# Patient Record
Sex: FEMALE | Race: BLACK OR AFRICAN AMERICAN | NOT HISPANIC OR LATINO | Employment: FULL TIME | URBAN - METROPOLITAN AREA
[De-identification: names, ages, dates, MRNs, and addresses within clinical notes are randomized per-mention and may not be internally consistent; named-entity substitution may affect disease eponyms.]

---
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
## 2020-06-01 LAB — POC BETA-HCG (QUAL): NEGATIVE

## 2020-08-03 LAB — POC BETA-HCG (QUAL): NEGATIVE

## 2020-08-07 ENCOUNTER — HISTORICAL (OUTPATIENT)
Dept: ADMINISTRATIVE | Facility: HOSPITAL | Age: 20
End: 2020-08-07

## 2020-08-07 LAB
HIV 1+2 AB+HIV1 P24 AG SERPL QL IA: NONREACTIVE
T PALLIDUM AB SER QL: NONREACTIVE

## 2020-11-17 ENCOUNTER — HISTORICAL (OUTPATIENT)
Dept: ADMINISTRATIVE | Facility: HOSPITAL | Age: 20
End: 2020-11-17

## 2020-11-17 LAB
HAV IGM SERPL QL IA: NONREACTIVE
HBV CORE IGM SERPL QL IA: NONREACTIVE
HBV SURFACE AG SERPL QL IA: NONREACTIVE
HCV AB SERPL QL IA: NONREACTIVE
HIV 1+2 AB+HIV1 P24 AG SERPL QL IA: NONREACTIVE
T PALLIDUM AB SER QL: NONREACTIVE

## 2020-12-01 ENCOUNTER — HISTORICAL (OUTPATIENT)
Dept: ADMINISTRATIVE | Facility: HOSPITAL | Age: 20
End: 2020-12-01

## 2020-12-01 LAB
FLUAV AG UPPER RESP QL IA.RAPID: NEGATIVE
FLUBV AG UPPER RESP QL IA.RAPID: NEGATIVE
SARS-COV-2 RNA RESP QL NAA+PROBE: NOT DETECTED

## 2022-04-09 ENCOUNTER — HISTORICAL (OUTPATIENT)
Dept: ADMINISTRATIVE | Facility: HOSPITAL | Age: 22
End: 2022-04-09
Payer: MEDICAID

## 2022-04-29 VITALS
OXYGEN SATURATION: 100 % | DIASTOLIC BLOOD PRESSURE: 64 MMHG | WEIGHT: 129.88 LBS | HEIGHT: 65 IN | BODY MASS INDEX: 21.64 KG/M2 | SYSTOLIC BLOOD PRESSURE: 117 MMHG

## 2022-04-30 NOTE — PROGRESS NOTES
Patient:   Shani Stephens             MRN: [id number]            FIN: [id number]               Age:   20 years     Sex:  Female     :  2000   Associated Diagnoses:   Routine screening for STI (sexually transmitted infection)   Author:   Nancy Perez MD      Chief Complaint   2020 8:54 CST      STI exposure      History of Present Illness   21 y/o female presents to walk-in clinic for STI testing     Hx of Gonorrhea/Chlamydia s/p treatment 2 months ago.  Currently asymptomatic, no known exposure in particular  Denies dysuria, hematuria, vaginal bleeding, vaginal discharge, vaginal lesions, fever, rash, abdominal pain, or CVA pain  Sexually active with one male partner, uses condoms consistently.    Also reports an episode of vaginal pruritus one week ago that resolved with Monistat  Currently on menstrual cycle and thus is declining pelvic exam today      Review of Systems   Constitutional:  No fever, No chills.    Respiratory:  No shortness of breath.    Cardiovascular:  No chest pain, No peripheral edema.    Gastrointestinal:  No nausea, No vomiting, No diarrhea, No constipation, No abdominal pain.    Genitourinary:  No dysuria, No hematuria.       Physical Examination   Vital Signs   2020 8:54 CST      Temperature Oral          36.5 DegC                             Temperature Oral (calculated)             97.70 DegF                             Peripheral Pulse Rate     77 bpm                             Respiratory Rate          18 br/min                             SpO2                      100 %                             Oxygen Therapy            Room air                             Systolic Blood Pressure   106 mmHg                             Diastolic Blood Pressure  71 mmHg                             Blood Pressure Location   Left arm                             Manual Cuff BP            Yes                             O2 SAT at rest            100 %     General:  No acute  distress, Patient seated on exam table upon my arrival.    Eye:  Extraocular movements are intact, Normal conjunctiva.    HENT:  Normocephalic.    Neck:  Supple, Non-tender, No lymphadenopathy, No thyromegaly.    Respiratory:  Lungs are clear to auscultation, Respirations are non-labored, Breath sounds are equal, Symmetrical chest wall expansion.    Cardiovascular:  Normal rate, Regular rhythm, No edema.    Gastrointestinal:  Soft, Non-tender, Non-distended, Normal bowel sounds.    Musculoskeletal:  No swelling, No deformity.    Integumentary:  Warm, Dry, Intact, No pallor, No rash.    Neurologic:  Alert, Oriented, No focal deficits.    Cognition and Speech:  Speech clear and coherent.    Psychiatric:  Cooperative, Appropriate mood & affect.       Impression and Plan   Diagnosis     Routine screening for STI (sexually transmitted infection) ([id number] Z11.3).       Urine sent for GC, chlamydia  Also testing for HIV, syphilis, hepatitis  Will call with resultsPH #[phone]    Nancy Perez MD -II      Family Medicine Resident      no

## 2022-05-02 NOTE — HISTORICAL [facility] CERNER
This is a historical note converted from Hector. Formatting and pictures may have been removed.  Please reference Hector for original formatting and attached multimedia. Chief Complaint  6 month f/u  History of Present Illness  19 yo AAF with no PMH reports to [facility] for annual well checkup.? No complaints. ?Patient is?followed by [facility] clinic?for?Depo-Provera injections every 3 months.? Last Depo-Provera?on June 27.? Sexually active with one male partner reports consistent condom use.? Has 1-year-old daughter.? Works at [employer].? 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 ?Immediate family members have no health problems. ?She has never been a smoker never used illicit drugs or tried alcohol  ?  Review of Systems  Constitutional: No fever. No chills. No weakness. No fatigue. No decreased activity.?  Eye: No recent visual problem. No blurry vision.?  Respiratory: No shortness of breath.?  Cardiovascular: No chest pain. No palpitations. No edema.?  Gastrointestinal: No n/v/d. No constipation. No abdominal pain.?  Musculoskeletal: No muscle pain. No joint pain.?  Integumentary: No rashes. No lesions.?  Neurologic: No numbness. No tingling, No headache.?  Psychiatric: No depression. No anxiety. No SI, HI or AVH.?  Physical Exam  Gen: NAD  HEENT: NCAT, PEERL, EOMI, MMM  Neck: supple, NT, no thyromegally  CV: RRR, no m/r/g, no edema  Resp: CTAB  Abd: soft, NT, ND, +BS  Ext: no swelling, no tenderness, no deformity  Neuro: A&Ox3, no focal deficits  Psych: cooperative, appropriate mood and affect?  Assessment/Plan  1.?Well adult health check  -Patient declines?routine STI testing  -Anticipatory guidance including safe sex, education,?parenting  -Pap Smear not indicated until 21 years old  -Up-to-date on immunizations  Return to clinic in 1 year?for well checkup  Ordered:  CBC w/ Auto Diff, Routine collect, 07/02/18 8:21:00 CDT, Blood, Order for future visit, Stop date 07/02/18 8:21:00  CDT, Lab Collect, [facility] adult health check, 07/02/18 8:21:00 CDT  Clinic Follow up, *Est. 06/02/19 3:00:00 CDT, Order for future visit, [facility] adult health check, [facility]  Comprehensive Metabolic Panel, Routine collect, 07/02/18 8:21:00 CDT, Blood, Order for future visit, Stop date 07/02/18 8:21:00 CDT, Lab Collect, [facility] adult health check, 07/02/18 8:21:00 CDT  ?   Problem List/Past Medical History  Ongoing  No qualifying data  Historical  Pregnant  Procedure/Surgical History  Extraction of Products of Conception, Vacuum, Via Natural or Artificial Opening (03/18/2017), Repair Perineum Muscle, Open Approach (03/18/2017), Repair Vulva, External Approach (03/18/2017), none.  Medications  No active medications  Allergies  No Known Medication Allergies  Social History  Alcohol  Never, 01/21/2017  Substance Abuse  Never, 03/18/2017  Tobacco  Never smoker, 05/13/2016  Health Maintenance  Health Maintenance  ???Pending?(in the next year)  ??? ??Due?  ??? ? ? ?Alcohol Misuse Screening due??07/02/18??and every 1??year(s)  ??? ? ? ?Tetanus Vaccine due??07/02/18??and every 10??year(s)  ??? ??Due In Future?  ??? ? ? ?Influenza Vaccine not due until??10/02/18??and every 1??year(s)  ??? ? ? ?Blood Pressure Screening not due until??01/03/19??and every 1??year(s)  ??? ? ? ?Body Mass Index Check not due until??01/03/19??and every 1??year(s)  ??? ? ? ?Depression Screening not due until??01/03/19??and every 1??year(s)  ??? ? ? ?Obesity Screening not due until??01/03/19??and every 1??year(s)  ??? ? ? ?Tobacco Use Screening not due until??01/03/19??and every 1??year(s)  ???Satisfied?(in the past 1 year)  ??? ??Satisfied?  ??? ? ? ?Blood Pressure Screening on??01/03/18.??Satisfied by Yvonne Waldrop LPN  ??? ? ? ?Body Mass Index Check on??01/03/18.??Satisfied by SYSTEM  ??? ? ? ?Depression Screening on??01/03/18.??Satisfied by Yvonne Waldrop LPN  ??? ? ? ?Obesity Screening on??01/03/18.??Satisfied by Yvonne Waldrop LPN  ??? ?  ? ?Tobacco Use Screening on??01/03/18.??Satisfied by Yvonne Waldrop LPN  ?  ?      Discussed with resident at the time of visit. History of Present Illness, Physical Exam, and Assessment and Plan reviewed. Care provided was reasonable and necessary. Jammie Frank   Recommend? GC/Chlamydia testing with PAP and offering of STD testing at next visit

## 2022-05-02 NOTE — HISTORICAL [facility] CERNER
This is a historical note converted from Hector. Formatting and pictures may have been removed.  Please reference Hector for original formatting and attached multimedia. Chief Complaint  Results and medication  History of Present Illness  21 yo F who recently underwent STD testing due to vaginal bleeding after coitus.? She is in clinic to receive Antibiotic treatment and lab testing for Syphilis and HIV  Review of Systems  Gen: denies fever, chills  Abd: denies n,v,d  Physical Exam  Vitals & Measurements  T:?36.9? ?C (Oral)? HR:?73(Peripheral)? RR:?18? BP:?106/65? SpO2:?100%?  HT:?165.00?cm? WT:?58.200?kg? BMI:?21.38? LMP:?08/04/2020 00:00 CDT?  Gen: alert and oriented  Resp: non labored respirations  Abd: nontender to palpation over abdomen or suprapubic area  Assessment/Plan  1.?Chlamydia?A74.9  ?-She is receiving 500mg of Ceftriaxone IM and 1gram of Azithromycin po  ?-She will also be tested for HIV and Syphillis  Ordered:  Clinic Follow up, *Est. 02/07/21 3:00:00 CST, Order for future visit, Gonorrhea  Chlamydia  Screening for STDs (sexually transmitted diseases), [facility]  ?  2.?Gonorrhea?A54.9  ?-As above  Ordered:  Clinic Follow up, *Est. 02/07/21 3:00:00 CST, Order for future visit, Gonorrhea  Chlamydia  Screening for STDs (sexually transmitted diseases), [facility]  ?   Medications  Depo-Provera 150 mg/mL intramuscular suspension, 150 mg= 1 mL, IM, Once  Allergies  No Known Medication Allergies      HPI / PE reviewed. Agree with assessment; Management and plan of care appropriate.  Partner evaluation / treatment recommended.

## 2022-09-01 ENCOUNTER — OFFICE VISIT (OUTPATIENT)
Dept: URGENT CARE | Facility: CLINIC | Age: 22
End: 2022-09-01
Payer: MEDICAID

## 2022-09-01 VITALS
SYSTOLIC BLOOD PRESSURE: 111 MMHG | HEART RATE: 73 BPM | HEIGHT: 65 IN | OXYGEN SATURATION: 100 % | BODY MASS INDEX: 23.36 KG/M2 | RESPIRATION RATE: 16 BRPM | WEIGHT: 140.19 LBS | DIASTOLIC BLOOD PRESSURE: 72 MMHG | TEMPERATURE: 99 F

## 2022-09-01 DIAGNOSIS — M79.18 MUSCULOSKELETAL PAIN: Primary | ICD-10-CM

## 2022-09-01 PROCEDURE — 99213 OFFICE O/P EST LOW 20 MIN: CPT | Mod: PBBFAC | Performed by: FAMILY MEDICINE

## 2022-09-01 PROCEDURE — 99213 PR OFFICE/OUTPT VISIT, EST, LEVL III, 20-29 MIN: ICD-10-PCS | Mod: S$PBB,,, | Performed by: FAMILY MEDICINE

## 2022-09-01 PROCEDURE — 99213 OFFICE O/P EST LOW 20 MIN: CPT | Mod: S$PBB,,, | Performed by: FAMILY MEDICINE

## 2022-09-01 RX ORDER — DICLOFENAC SODIUM 75 MG/1
75 TABLET, DELAYED RELEASE ORAL 2 TIMES DAILY
Qty: 30 TABLET | Refills: 1 | Status: SHIPPED | OUTPATIENT
Start: 2022-09-01 | End: 2023-01-09

## 2022-09-01 NOTE — PROGRESS NOTES
"Subjective:       Patient ID: Shani Stephens is a 22 y.o. female.    Vitals:  height is 5' 5.35" (1.66 m) and weight is 63.6 kg (140 lb 3.4 oz). Her temperature is 99.1 °F (37.3 °C). Her blood pressure is 111/72 and her pulse is 73. Her respiration is 16 and oxygen saturation is 100%.     Chief Complaint: Shoulder Pain (X 1wk  lt side radiating to rib area)    HPI  Several days of pain a in the left trapezius area, no precipitating event.  No neck pain, no actual shoulder or rib pain.  No chest discomfort.  No cough or shortness of breath.  Denies radicular symptoms.  Does lots of lifting at [employer]  ROS    Constitutional: negative except as stated in HPI  Eye: negative except as stated in HPI  ENT: negative except as stated in HPI  Respiratory: negative except as stated in HPI  Cardiovascular: negative except as stated in HPI  Gastrointestinal: negative except as stated in HPI  Genitourinary: negative except as stated in HPI  Objective:      Physical Exam    VITAL SIGNS:  Reviewed.      GENERAL:  In no apparent distress  HEAD:  No signs of head trauma    MUSCULOSKELETAL:  CERVICAL SPINE:   - FROM   - no bony tenderness or step offs   - spasm and tenderness of left trapezius, reproduces symptoms   - negative Spurling´s bilaterally  Left SHOULDER    - No erythema or edema   - ROM fair   - Nontender to palpation   - negative drop test, negative empty can, negative Speed's, negative infraspinatus test, negative subscapularis test, negative Hawkin´s,  negative Neer´s  negative crossover test,    - 5/5 strength biceps/triceps/   - Cap refill ?2 seconds   - Sensory intact to light touch distally  NEUROLOGIC EXAM:  Alert and oriented x 3.  No focal sensory or strength deficits.   Speech normal.  Follows commands      Assessment:       1. Musculoskeletal pain            Plan:         Musculoskeletal pain    Other orders  -     diclofenac (VOLTAREN) 75 MG EC tablet; Take 1 tablet (75 mg total) by mouth 2 (two) times " daily.  Dispense: 30 tablet; Refill: 1         Diclofenac with food, discussed potential side effects.  Use low heat, topical analgesics.  Please follow instructions on patient education material.  Return to urgent care in 2 to 3 days if symptoms are not improving, immediately if you develop any new or worsening symptoms.

## 2022-09-16 ENCOUNTER — HISTORICAL (OUTPATIENT)
Dept: ADMINISTRATIVE | Facility: HOSPITAL | Age: 22
End: 2022-09-16
Payer: MEDICAID

## 2022-11-25 PROCEDURE — 81025 URINE PREGNANCY TEST: CPT | Performed by: INTERNAL MEDICINE

## 2022-11-25 PROCEDURE — 81003 URINALYSIS AUTO W/O SCOPE: CPT | Performed by: INTERNAL MEDICINE

## 2022-11-25 PROCEDURE — 81001 URINALYSIS AUTO W/SCOPE: CPT | Performed by: INTERNAL MEDICINE

## 2022-11-25 PROCEDURE — 99284 EMERGENCY DEPT VISIT MOD MDM: CPT | Mod: 25

## 2022-11-25 PROCEDURE — 87088 URINE BACTERIA CULTURE: CPT | Performed by: INTERNAL MEDICINE

## 2022-11-26 ENCOUNTER — HOSPITAL ENCOUNTER (EMERGENCY)
Facility: HOSPITAL | Age: 22
Discharge: HOME | End: 2022-11-26
Attending: INTERNAL MEDICINE
Payer: MEDICAID

## 2022-11-26 VITALS
HEART RATE: 74 BPM | WEIGHT: 145 LBS | SYSTOLIC BLOOD PRESSURE: 129 MMHG | DIASTOLIC BLOOD PRESSURE: 77 MMHG | TEMPERATURE: 98 F | RESPIRATION RATE: 18 BRPM | HEIGHT: 64 IN | OXYGEN SATURATION: 100 % | BODY MASS INDEX: 24.75 KG/M2

## 2022-11-26 DIAGNOSIS — Z20.2 EXPOSURE TO SEXUALLY TRANSMITTED DISEASE (STD): Primary | ICD-10-CM

## 2022-11-26 DIAGNOSIS — N30.00 ACUTE CYSTITIS WITHOUT HEMATURIA: ICD-10-CM

## 2022-11-26 LAB
APPEARANCE UR: ABNORMAL
B-HCG SERPL QL: NEGATIVE
BACTERIA #/AREA URNS AUTO: ABNORMAL /HPF
BILIRUB UR QL STRIP.AUTO: NEGATIVE MG/DL
COLOR UR AUTO: YELLOW
GLUCOSE UR QL STRIP.AUTO: NEGATIVE MG/DL
KETONES UR QL STRIP.AUTO: NEGATIVE MG/DL
LEUKOCYTE ESTERASE UR QL STRIP.AUTO: ABNORMAL UNIT/L
MUCOUS THREADS URNS QL MICRO: ABNORMAL /LPF
NITRITE UR QL STRIP.AUTO: NEGATIVE
PH UR STRIP.AUTO: 7 [PH]
PROT UR QL STRIP.AUTO: NEGATIVE MG/DL
RBC #/AREA URNS AUTO: ABNORMAL /HPF
RBC UR QL AUTO: NEGATIVE UNIT/L
SP GR UR STRIP.AUTO: 1.02
SQUAMOUS #/AREA URNS AUTO: ABNORMAL /HPF
UROBILINOGEN UR STRIP-ACNC: 1 MG/DL
WBC #/AREA URNS AUTO: ABNORMAL /HPF

## 2022-11-26 PROCEDURE — 63700000 PHARM REV CODE 250 ALT 637 W/O HCPCS: Performed by: INTERNAL MEDICINE

## 2022-11-26 PROCEDURE — 63600175 PHARM REV CODE 636 W HCPCS: Performed by: INTERNAL MEDICINE

## 2022-11-26 PROCEDURE — 96372 THER/PROPH/DIAG INJ SC/IM: CPT | Performed by: INTERNAL MEDICINE

## 2022-11-26 PROCEDURE — 25000003 PHARM REV CODE 250: Performed by: INTERNAL MEDICINE

## 2022-11-26 RX ORDER — CEFTRIAXONE 1 G/1
500 INJECTION, POWDER, FOR SOLUTION INTRAMUSCULAR; INTRAVENOUS ONCE
Status: COMPLETED | OUTPATIENT
Start: 2022-11-26 | End: 2022-11-26

## 2022-11-26 RX ORDER — AZITHROMYCIN 250 MG/1
1000 TABLET, FILM COATED ORAL ONCE
Status: COMPLETED | OUTPATIENT
Start: 2022-11-26 | End: 2022-11-26

## 2022-11-26 RX ORDER — LIDOCAINE HYDROCHLORIDE 10 MG/ML
1 INJECTION INFILTRATION; PERINEURAL ONCE
Status: COMPLETED | OUTPATIENT
Start: 2022-11-26 | End: 2022-11-26

## 2022-11-26 RX ORDER — METRONIDAZOLE 500 MG/1
2 TABLET ORAL ONCE
Status: COMPLETED | OUTPATIENT
Start: 2022-11-26 | End: 2022-11-26

## 2022-11-26 RX ORDER — DOXYCYCLINE 100 MG/1
100 CAPSULE ORAL EVERY 12 HOURS
Qty: 14 CAPSULE | Refills: 0 | Status: SHIPPED | OUTPATIENT
Start: 2022-11-26 | End: 2022-12-03

## 2022-11-26 RX ADMIN — METRONIDAZOLE 2 G: 500 TABLET ORAL at 01:11

## 2022-11-26 RX ADMIN — CEFTRIAXONE SODIUM 500 MG: 1 INJECTION, POWDER, FOR SOLUTION INTRAMUSCULAR; INTRAVENOUS at 01:11

## 2022-11-26 RX ADMIN — LIDOCAINE HYDROCHLORIDE 1 ML: 10 INJECTION, SOLUTION INFILTRATION; PERINEURAL at 01:11

## 2022-11-26 RX ADMIN — AZITHROMYCIN MONOHYDRATE 1000 MG: 250 TABLET ORAL at 01:11

## 2022-11-26 NOTE — ED PROVIDER NOTES
"     Source of History:  Patient, no limitations    Chief complaint:  Exposure to STD ("My friend told me to come get tested fr STD.")      HPI:  Shani Stephens is a 22 y.o. female presenting with Exposure to STD ("My friend told me to come get tested fr STD.")         Sexually Transmitted Disease Check: Patient presents for sexually transmitted disease check. Sexual history reviewed with the patient. STD exposure: partner notified her of STD.  Previous history of STD:  none. Current symptoms include none.  Contraception: none.        Review of Systems   Constitutional symptoms:  Negative except as documented in HPI.   Skin symptoms:  Negative except as documented in HPI.   HEENT symptoms:  Negative except as documented in HPI.   Respiratory symptoms:  Negative except as documented in HPI.   Cardiovascular symptoms:  Negative except as documented in HPI.   Gastrointestinal symptoms:  Negative except as documented in HPI.    Genitourinary symptoms:  Negative except as documented in HPI.   Musculoskeletal symptoms:  Negative except as documented in HPI.   Neurologic symptoms:  Negative except as documented in HPI.   Psychiatric symptoms:  Negative except as documented in HPI.   Allergy/immunologic symptoms:  Negative except as documented in HPI.             Additional review of systems information: All other systems reviewed and otherwise negative.      Review of patient's allergies indicates:  No Known Allergies    PMH:  As per HPI and below:    History reviewed. No pertinent past medical history.     Family History   Problem Relation Age of Onset    No Known Problems Mother     No Known Problems Father        History reviewed. No pertinent surgical history.    Social History     Tobacco Use    Smoking status: Never    Smokeless tobacco: Never   Substance Use Topics    Alcohol use: Never    Drug use: Never       There is no problem list on file for this patient.       Physical Exam:    /66 (BP Location: Left " "arm, Patient Position: Sitting)   Pulse 63   Temp 98.2 °F (36.8 °C) (Oral)   Resp 18   Ht 5' 4" (1.626 m)   Wt 65.8 kg (145 lb)   LMP 11/07/2022   SpO2 100%   BMI 24.89 kg/m²     Nursing note and vital signs reviewed.    General:  Alert, no acute distress.   Skin: Normal for Ethnic Origin, No cyanosis  HEENT: Normocephalic and atraumatic, Vision unchanged, Pupils symmetric, No icterus , Nasal mucosa is pink and moist  Cardiovascular:  Regular rate and rhythm, No edema  Chest Wall: No deformity, equal chest rise  Respiratory:  Lungs are clear to auscultation, respirations are non-labored.    Musculoskeletal:  No deformity, Normal perfusion to all extremities  Back: No bony tenderness  : No suprapubic pain, No CVA tenderness  Gastrointestinal:  Soft, Nontender, Non distended, Normal bowel sounds.    Neurological:  Alert and oriented to person, place, time, and situation, normal motor observed, normal speech observed.    Psychiatric:  Cooperative, appropriate mood & affect.        Labs that have been ordered have been independently reviewed and interpreted by myself.     Old Chart Reviewed.      Initial Impression/ Differential Dx:  Vaginal infection such as yeast infection, vaginitis, topical irritant, bacterial vaginosis, STD such as gonorrhea, chlamydia, UTI, discomfort of pregnancy, ectopic pregnancy, ovarian cysts, ovarian torsion, malignancy, constipation, colitis, diverticulitis      MDM:      Reviewed Nurses Note.    Reviewed Pertinent old records.    Orders Placed This Encounter    Urine culture    Urinalysis    Pregnancy, urine rapid    Urinalysis, Microscopic    cefTRIAXone injection 500 mg    LIDOcaine HCL 10 mg/ml (1%) injection 1 mL    azithromycin tablet 1,000 mg    metroNIDAZOLE tablet 2 g    doxycycline (MONODOX) 100 MG capsule                    Labs Reviewed   URINALYSIS - Abnormal; Notable for the following components:       Result Value    Appearance, UA Hazy (*)     Leukocyte Esterase, " UA Trace (*)     All other components within normal limits   URINALYSIS, MICROSCOPIC - Abnormal; Notable for the following components:    Mucous, UA Small (*)     WBC, UA 21-50 (*)     Squamous Epithelial Cells, UA Few (*)     All other components within normal limits   PREGNANCY TEST, URINE RAPID - Normal   CULTURE, URINE          No orders to display        Admission on 11/26/2022   Component Date Value Ref Range Status    Color, UA 11/25/2022 Yellow  Yellow, Light-Yellow, Dark Yellow, Estefany, Straw Final    Appearance, UA 11/25/2022 Hazy (A)  Clear Final    Specific Gravity, UA 11/25/2022 1.020   Final    pH, UA 11/25/2022 7.0  5.0 - 8.5 Final    Protein, UA 11/25/2022 Negative  Negative mg/dL Final    Glucose, UA 11/25/2022 Negative  Negative, Normal mg/dL Final    Ketones, UA 11/25/2022 Negative  Negative mg/dL Final    Blood, UA 11/25/2022 Negative  Negative unit/L Final    Bilirubin, UA 11/25/2022 Negative  Negative mg/dL Final    Urobilinogen, UA 11/25/2022 1.0  0.2, 1.0, Normal mg/dL Final    Nitrites, UA 11/25/2022 Negative  Negative Final    Leukocyte Esterase, UA 11/25/2022 Trace (A)  Negative unit/L Final    Beta hCG Qualitative, Urine 11/25/2022 Negative  Negative Final    Bacteria, UA 11/25/2022 Rare  None Seen, Rare, Occasional /HPF Final    Mucous, UA 11/25/2022 Small (A)  None Seen /LPF Final    RBC, UA 11/25/2022 3-5  None Seen, 0-2, 3-5, 0-5 /HPF Final    WBC, UA 11/25/2022 21-50 (A)  None Seen, 0-2, 3-5, 0-5 /HPF Final    Squamous Epithelial Cells, UA 11/25/2022 Few (A)  None Seen, Rare, Occasional, Occ /HPF Final       Imaging Results    None                                              Diagnostic Impression:    1. Exposure to sexually transmitted disease (STD)    2. Acute cystitis without hematuria         ED Disposition Condition    Discharge Stable             Follow-up Information       [facility] - Emergency Dept.    Specialty: Emergency Medicine  Why: If symptoms  worsen  Contact information:  [address]  [facility] [address]  [phone]                            ED Prescriptions       Medication Sig Dispense Start Date End Date Auth. Provider    doxycycline (MONODOX) 100 MG capsule Take 1 capsule (100 mg total) by mouth every 12 (twelve) hours. for 7 days 14 capsule 11/26/2022 12/3/2022 Parish Ravi,           Follow-up Information       Follow up With Specialties Details Why Contact [facility] - Emergency Dept Emergency Medicine  If symptoms worsen [address]  [facility] [address]  [phone]             Parish Ravi,   11/26/22 0116

## 2022-11-28 LAB — BACTERIA UR CULT: ABNORMAL

## 2023-01-09 ENCOUNTER — OFFICE VISIT (OUTPATIENT)
Dept: FAMILY MEDICINE | Facility: CLINIC | Age: 23
End: 2023-01-09
Payer: MEDICAID

## 2023-01-09 VITALS
SYSTOLIC BLOOD PRESSURE: 122 MMHG | BODY MASS INDEX: 24.41 KG/M2 | HEART RATE: 80 BPM | HEIGHT: 64 IN | DIASTOLIC BLOOD PRESSURE: 74 MMHG | OXYGEN SATURATION: 100 % | RESPIRATION RATE: 18 BRPM | WEIGHT: 143 LBS | TEMPERATURE: 98 F

## 2023-01-09 DIAGNOSIS — Z12.4 SCREENING FOR CERVICAL CANCER: ICD-10-CM

## 2023-01-09 DIAGNOSIS — N92.6 MENSTRUAL PERIOD LATE: Primary | ICD-10-CM

## 2023-01-09 DIAGNOSIS — Z23 IMMUNIZATION DUE: ICD-10-CM

## 2023-01-09 LAB
B-HCG UR QL: NEGATIVE
CTP QC/QA: YES

## 2023-01-09 PROCEDURE — 87624 HPV HI-RISK TYP POOLED RSLT: CPT

## 2023-01-09 PROCEDURE — 90651 9VHPV VACCINE 2/3 DOSE IM: CPT | Mod: PBBFAC

## 2023-01-09 PROCEDURE — 81025 URINE PREGNANCY TEST: CPT | Mod: PBBFAC

## 2023-01-09 PROCEDURE — 90471 IMMUNIZATION ADMIN: CPT | Mod: PBBFAC

## 2023-01-09 PROCEDURE — 99213 OFFICE O/P EST LOW 20 MIN: CPT | Mod: PBBFAC

## 2023-01-09 RX ADMIN — HUMAN PAPILLOMAVIRUS 9-VALENT VACCINE, RECOMBINANT 0.5 ML: 30; 40; 60; 40; 20; 20; 20; 20; 20 INJECTION, SUSPENSION INTRAMUSCULAR at 01:01

## 2023-01-09 NOTE — PROGRESS NOTES
"  [facility] Note    Subjective:     Patient ID: Shani Stephens is a 22 y.o. female    Chief Complaint:   Chief Complaint   Patient presents with    Routine Pap smear       HPI  23 yo F presents for routine visit    Acute Concerns:  Patient requesting pap smear today. Denies any vaginal discharge or pruritus, declines STD testing today.    Has been actively trying to get pregnant and wanted to make sure everything was "ok with her". Currently has a 4yo daughter and reports no difficulty getting pregnant with her. LMP 12/05/2023, patient reports menstrual period is usually regular.     No other complaint or concerns today. No chronic conditions.     Healthcare Maintenance:  Cervical Ca Scrn: Last pap smear 08/2020; NIL  Immunizations: Due for HPV, Influenza, and Tetanus vaccine    Review of Systems  As per HPI    Objective:     Vitals:    01/09/23 1307   BP: 122/74   Pulse: 80   Resp: 18   Temp: 98.4 °F (36.9 °C)       Physical Exam    General: Well developed, no acute distress  HEENT: oral mucosa moist, no pharyngeal erythema. EOMI  CV: Regular rate rhythm, no murmurs, no edema, 2+ peripheral pulses  Resp: Non-labored breathing, symmetrical chest expansion bilaterally  Abd: soft, non-distended, non-tender to palpation, +BS, no organomegaly  : - External genitalia: No lesions, no erythema  - Urethral meatus: No abnormalities noted  - Bladder: No suprapubic tenderness  - Vaginal/pelvic support: No lesions, pink, moist vaginal mucosa without lesions. No blood, no discharge.  - Cervix: present with no gross mass, bleeding with cytobroom manipulation  - Uterus: mobile at pelvic brim  - Adnexa/parametria: No fullness or masses  - Anus/perineum: Intact without lesions or hemorrhoids      Assessment:     Problem List Items Addressed This Visit    None  Visit Diagnoses       Screening for cervical cancer    -  Primary    Relevant Orders    Liquid-Based Pap Smear, Screening Screening    Menstrual period " late        Relevant Orders    POCT Urine Pregnancy            Plan:       Menstrual Period Late  - Pregnancy Test in office  - Advised patient the best time to conceive is 12-17 days from day 1 of menstrual period, advised to keep ovulation calendar    Screening For Cervical Cancer  - Pap smear today    Healthcare Maintenance  - Patient declined Influenza vaccine  - Received Dose 1 of HPV Vaccine    RTC in 4 months for routine visit    Emely Gunter MD  [employer] [facility]  PGY-3

## 2023-01-20 LAB
HPV16+18+H RISK 12 DNA CVX-IMP: NEGATIVE
INSULIN SERPL-ACNC: ABNORMAL U[IU]/ML
LAB AP BETHESDA CATEGORY: ABNORMAL
LAB AP CLINICAL FINDINGS: ABNORMAL
LAB AP CONTRACEPTIVES: ABNORMAL
LAB AP GYN MOLECULAR TESTING: ABNORMAL
LAB AP LMP DATE: ABNORMAL
LAB AP OCHS PAP SPECIMEN ADEQUACY: ABNORMAL
LAB AP OHS PAP INTERPRETATION: ABNORMAL
LAB AP PAP DISCLAIMER COMMENTS: ABNORMAL
LAB AP PAP ESTROGEN REPLACEMENT THERAPY: ABNORMAL
LAB AP PAP PMP: ABNORMAL
LAB AP PAP PREVIOUS BX: ABNORMAL
LAB AP PAP PRIOR TREATMENT: ABNORMAL

## 2023-03-07 ENCOUNTER — OFFICE VISIT (OUTPATIENT)
Dept: URGENT CARE | Facility: CLINIC | Age: 23
End: 2023-03-07
Payer: MEDICAID

## 2023-03-07 VITALS
HEART RATE: 80 BPM | HEIGHT: 63 IN | SYSTOLIC BLOOD PRESSURE: 112 MMHG | BODY MASS INDEX: 25.34 KG/M2 | WEIGHT: 143 LBS | OXYGEN SATURATION: 100 % | RESPIRATION RATE: 16 BRPM | TEMPERATURE: 98 F | DIASTOLIC BLOOD PRESSURE: 66 MMHG

## 2023-03-07 DIAGNOSIS — R10.30 LOWER ABDOMINAL PAIN: Primary | ICD-10-CM

## 2023-03-07 LAB
APPEARANCE UR: ABNORMAL
B-HCG FREE SERPL-ACNC: 98.63 MIU/ML
B-HCG UR QL: NEGATIVE
BACTERIA #/AREA URNS AUTO: ABNORMAL /HPF
BILIRUB UR QL STRIP.AUTO: NEGATIVE MG/DL
BILIRUB UR QL STRIP: NEGATIVE
C TRACH DNA SPEC QL NAA+PROBE: NOT DETECTED
COLOR UR AUTO: ABNORMAL
CTP QC/QA: YES
GLUCOSE UR QL STRIP.AUTO: NORMAL MG/DL
GLUCOSE UR QL STRIP: NEGATIVE
HYALINE CASTS #/AREA URNS LPF: ABNORMAL /LPF
KETONES UR QL STRIP.AUTO: NEGATIVE MG/DL
KETONES UR QL STRIP: NEGATIVE
LEUKOCYTE ESTERASE UR QL STRIP.AUTO: 75 UNIT/L
LEUKOCYTE ESTERASE UR QL STRIP: POSITIVE
MUCOUS THREADS URNS QL MICRO: ABNORMAL /LPF
N GONORRHOEA DNA SPEC QL NAA+PROBE: NOT DETECTED
NITRITE UR QL STRIP.AUTO: NEGATIVE
PH UR STRIP.AUTO: 7 [PH]
PH, POC UA: 7
POC BLOOD, URINE: NEGATIVE
POC NITRATES, URINE: NEGATIVE
PROT UR QL STRIP.AUTO: ABNORMAL MG/DL
PROT UR QL STRIP: POSITIVE
RBC #/AREA URNS AUTO: ABNORMAL /HPF
RBC UR QL AUTO: NEGATIVE UNIT/L
SP GR UR STRIP.AUTO: 1.03
SP GR UR STRIP: 1.02 (ref 1–1.03)
SQUAMOUS #/AREA URNS LPF: ABNORMAL /HPF
UROBILINOGEN UR STRIP-ACNC: 0.2 (ref 0.1–1.1)
UROBILINOGEN UR STRIP-ACNC: NORMAL MG/DL
WBC #/AREA URNS AUTO: ABNORMAL /HPF

## 2023-03-07 PROCEDURE — 99214 OFFICE O/P EST MOD 30 MIN: CPT | Mod: S$PBB,,, | Performed by: FAMILY MEDICINE

## 2023-03-07 PROCEDURE — 99213 OFFICE O/P EST LOW 20 MIN: CPT | Mod: PBBFAC | Performed by: FAMILY MEDICINE

## 2023-03-07 PROCEDURE — 81001 URINALYSIS AUTO W/SCOPE: CPT | Performed by: FAMILY MEDICINE

## 2023-03-07 PROCEDURE — 99214 PR OFFICE/OUTPT VISIT, EST, LEVL IV, 30-39 MIN: ICD-10-PCS | Mod: S$PBB,,, | Performed by: FAMILY MEDICINE

## 2023-03-07 PROCEDURE — 81003 URINALYSIS AUTO W/O SCOPE: CPT | Mod: PBBFAC | Performed by: FAMILY MEDICINE

## 2023-03-07 PROCEDURE — 84702 CHORIONIC GONADOTROPIN TEST: CPT | Performed by: FAMILY MEDICINE

## 2023-03-07 PROCEDURE — 81025 URINE PREGNANCY TEST: CPT | Mod: PBBFAC | Performed by: FAMILY MEDICINE

## 2023-03-07 PROCEDURE — 87591 N.GONORRHOEAE DNA AMP PROB: CPT | Performed by: FAMILY MEDICINE

## 2023-03-07 PROCEDURE — 36416 COLLJ CAPILLARY BLOOD SPEC: CPT | Performed by: FAMILY MEDICINE

## 2023-03-08 ENCOUNTER — PATIENT MESSAGE (OUTPATIENT)
Dept: FAMILY MEDICINE | Facility: CLINIC | Age: 23
End: 2023-03-08
Payer: MEDICAID

## 2023-03-08 ENCOUNTER — TELEPHONE (OUTPATIENT)
Dept: URGENT CARE | Facility: CLINIC | Age: 23
End: 2023-03-08
Payer: MEDICAID

## 2023-03-08 NOTE — TELEPHONE ENCOUNTER
----- Message from Burton Borden MD sent at 3/8/2023  9:12 AM CST -----  Please notify patient that her serum pregnancy test is positive.  She will need to begin prenatal vitamins.  We need to know if she requires a referral to OB or will she set this up on her own? If she has any abdominal pain or vaginal bleeding, she needs to go to the emergency room.  Thanks

## 2023-03-08 NOTE — PROGRESS NOTES
"Subjective:       Patient ID: Shani Stephens is a 22 y.o. female.    Chief Complaint: Nausea, Abdominal Pain (Lower abdominal pain), and Flank Pain (L side)      HPI  21 yo female with nausea, suprapubic pain, and left flank pain for about a week.  Took 2 home pregnancy tests over the last few days that were positive.  Pregnancy test in clinic today is negative.  LMP approximately 1 month ago.   Review of Systems   Gastrointestinal:         As above   Genitourinary:         As above       Objective:       Vital Signs  Temp: 98 °F (36.7 °C)  Temp Source: Oral  Pulse: 80  Resp: 16  SpO2: 100 %  BP: 112/66  Height and Weight  Height: 5' 3.39" (161 cm)  Weight: 64.9 kg (143 lb)  BSA (Calculated - sq m): 1.7 sq meters  BMI (Calculated): 25  Weight in (lb) to have BMI = 25: 142.6]  Physical Exam  Constitutional:       Appearance: Normal appearance.   HENT:      Head: Normocephalic and atraumatic.   Eyes:      Extraocular Movements: Extraocular movements intact.      Conjunctiva/sclera: Conjunctivae normal.   Cardiovascular:      Rate and Rhythm: Normal rate and regular rhythm.      Heart sounds: Normal heart sounds.   Pulmonary:      Breath sounds: Normal breath sounds.   Abdominal:      General: Abdomen is flat. Bowel sounds are normal.      Palpations: Abdomen is soft.      Comments: Mild suprapubic TTP   Skin:     General: Skin is warm and dry.   Neurological:      General: No focal deficit present.      Mental Status: She is alert.   Psychiatric:         Mood and Affect: Mood and affect normal.         Speech: Speech normal.         Behavior: Behavior normal. Behavior is cooperative.         Thought Content: Thought content does not include homicidal or suicidal ideation.       Assessment:       Problem List Items Addressed This Visit    None  Visit Diagnoses       Lower abdominal pain    -  Primary    Relevant Orders    POCT Urinalysis, Dipstick, Automated, W/O Scope (Completed)    POCT urine pregnancy (Completed) "    HCG, Quantitative    Chlamydia/GC, PCR    Urinalysis, Reflex to Urine Culture              Plan:   Labs pending  ER precautions  FU with PCP

## 2023-03-26 ENCOUNTER — HOSPITAL ENCOUNTER (EMERGENCY)
Facility: HOSPITAL | Age: 23
Discharge: HOME | End: 2023-03-26
Attending: FAMILY MEDICINE
Payer: MEDICAID

## 2023-03-26 VITALS
BODY MASS INDEX: 24.35 KG/M2 | HEIGHT: 64 IN | HEART RATE: 77 BPM | DIASTOLIC BLOOD PRESSURE: 66 MMHG | WEIGHT: 142.63 LBS | TEMPERATURE: 99 F | SYSTOLIC BLOOD PRESSURE: 120 MMHG | RESPIRATION RATE: 16 BRPM | OXYGEN SATURATION: 100 %

## 2023-03-26 DIAGNOSIS — O21.9 NAUSEA AND VOMITING DURING PREGNANCY PRIOR TO 22 WEEKS GESTATION: Primary | ICD-10-CM

## 2023-03-26 LAB
ALBUMIN SERPL-MCNC: 4 G/DL (ref 3.5–5)
ALBUMIN/GLOB SERPL: 1.1 RATIO (ref 1.1–2)
ALP SERPL-CCNC: 44 UNIT/L (ref 40–150)
ALT SERPL-CCNC: 12 UNIT/L (ref 0–55)
APPEARANCE UR: ABNORMAL
AST SERPL-CCNC: 15 UNIT/L (ref 5–34)
B-HCG FREE SERPL-ACNC: ABNORMAL MIU/ML
B-HCG UR QL: POSITIVE
BACTERIA #/AREA URNS AUTO: ABNORMAL /HPF
BASOPHILS # BLD AUTO: 0.02 X10(3)/MCL (ref 0–0.2)
BASOPHILS NFR BLD AUTO: 0.3 %
BILIRUB UR QL STRIP.AUTO: NEGATIVE MG/DL
BILIRUBIN DIRECT+TOT PNL SERPL-MCNC: 0.9 MG/DL
BUN SERPL-MCNC: 11.5 MG/DL (ref 7–18.7)
CALCIUM SERPL-MCNC: 9.8 MG/DL (ref 8.4–10.2)
CHLORIDE SERPL-SCNC: 105 MMOL/L (ref 98–107)
CO2 SERPL-SCNC: 22 MMOL/L (ref 22–29)
COLOR UR AUTO: ABNORMAL
CREAT SERPL-MCNC: 0.73 MG/DL (ref 0.55–1.02)
CTP QC/QA: YES
EOSINOPHIL # BLD AUTO: 0.05 X10(3)/MCL (ref 0–0.9)
EOSINOPHIL NFR BLD AUTO: 0.6 %
ERYTHROCYTE [DISTWIDTH] IN BLOOD BY AUTOMATED COUNT: 12.1 % (ref 11.5–17)
GFR SERPLBLD CREATININE-BSD FMLA CKD-EPI: >60 MLS/MIN/1.73/M2
GLOBULIN SER-MCNC: 3.7 GM/DL (ref 2.4–3.5)
GLUCOSE SERPL-MCNC: 85 MG/DL (ref 74–100)
GLUCOSE UR QL STRIP.AUTO: NORMAL MG/DL
GROUP & RH: NORMAL
HCT VFR BLD AUTO: 38.8 % (ref 37–47)
HGB BLD-MCNC: 12.6 G/DL (ref 12–16)
HYALINE CASTS #/AREA URNS LPF: ABNORMAL /LPF
IMM GRANULOCYTES # BLD AUTO: 0.02 X10(3)/MCL (ref 0–0.04)
IMM GRANULOCYTES NFR BLD AUTO: 0.3 %
KETONES UR QL STRIP.AUTO: NEGATIVE MG/DL
LEUKOCYTE ESTERASE UR QL STRIP.AUTO: 250 UNIT/L
LYMPHOCYTES # BLD AUTO: 1.51 X10(3)/MCL (ref 0.6–4.6)
LYMPHOCYTES NFR BLD AUTO: 19.1 %
MCH RBC QN AUTO: 25.9 PG (ref 27–31)
MCHC RBC AUTO-ENTMCNC: 32.5 G/DL (ref 33–36)
MCV RBC AUTO: 79.8 FL (ref 80–94)
MONOCYTES # BLD AUTO: 0.58 X10(3)/MCL (ref 0.1–1.3)
MONOCYTES NFR BLD AUTO: 7.3 %
MUCOUS THREADS URNS QL MICRO: ABNORMAL /LPF
NEUTROPHILS # BLD AUTO: 5.72 X10(3)/MCL (ref 2.1–9.2)
NEUTROPHILS NFR BLD AUTO: 72.4 %
NITRITE UR QL STRIP.AUTO: NEGATIVE
NRBC BLD AUTO-RTO: 0 %
PH UR STRIP.AUTO: 6.5 [PH]
PLATELET # BLD AUTO: 188 X10(3)/MCL (ref 130–400)
PMV BLD AUTO: 10.3 FL (ref 7.4–10.4)
POTASSIUM SERPL-SCNC: 3.9 MMOL/L (ref 3.5–5.1)
PROT SERPL-MCNC: 7.7 GM/DL (ref 6.4–8.3)
PROT UR QL STRIP.AUTO: NEGATIVE MG/DL
RBC # BLD AUTO: 4.86 X10(6)/MCL (ref 4.2–5.4)
RBC #/AREA URNS AUTO: ABNORMAL /HPF
RBC UR QL AUTO: NEGATIVE UNIT/L
SODIUM SERPL-SCNC: 136 MMOL/L (ref 136–145)
SP GR UR STRIP.AUTO: 1.02
SQUAMOUS #/AREA URNS LPF: ABNORMAL /HPF
UROBILINOGEN UR STRIP-ACNC: NORMAL MG/DL
WBC # SPEC AUTO: 7.9 X10(3)/MCL (ref 4.5–11.5)
WBC #/AREA URNS AUTO: ABNORMAL /HPF

## 2023-03-26 PROCEDURE — 80053 COMPREHEN METABOLIC PANEL: CPT | Performed by: FAMILY MEDICINE

## 2023-03-26 PROCEDURE — 86900 BLOOD TYPING SEROLOGIC ABO: CPT | Performed by: FAMILY MEDICINE

## 2023-03-26 PROCEDURE — 99283 EMERGENCY DEPT VISIT LOW MDM: CPT

## 2023-03-26 PROCEDURE — 85025 COMPLETE CBC W/AUTO DIFF WBC: CPT | Performed by: FAMILY MEDICINE

## 2023-03-26 PROCEDURE — 81025 URINE PREGNANCY TEST: CPT | Performed by: FAMILY MEDICINE

## 2023-03-26 PROCEDURE — 81001 URINALYSIS AUTO W/SCOPE: CPT | Performed by: FAMILY MEDICINE

## 2023-03-26 PROCEDURE — 25000003 PHARM REV CODE 250: Performed by: FAMILY MEDICINE

## 2023-03-26 PROCEDURE — 84702 CHORIONIC GONADOTROPIN TEST: CPT | Performed by: FAMILY MEDICINE

## 2023-03-26 RX ORDER — DOXYLAMINE SUCCINATE AND PYRIDOXINE HYDROCHLORIDE, DELAYED RELEASE TABLETS 10 MG/10 MG 10; 10 MG/1; MG/1
2 TABLET, DELAYED RELEASE ORAL NIGHTLY PRN
Qty: 30 TABLET | Refills: 0 | OUTPATIENT
Start: 2023-03-26 | End: 2023-06-12

## 2023-03-26 RX ORDER — METOCLOPRAMIDE HYDROCHLORIDE 5 MG/ML
10 INJECTION INTRAMUSCULAR; INTRAVENOUS
Status: DISCONTINUED | OUTPATIENT
Start: 2023-03-26 | End: 2023-03-26

## 2023-03-26 RX ORDER — METOCLOPRAMIDE 10 MG/1
10 TABLET ORAL
Status: COMPLETED | OUTPATIENT
Start: 2023-03-26 | End: 2023-03-26

## 2023-03-26 RX ADMIN — METOCLOPRAMIDE 10 MG: 10 TABLET ORAL at 11:03

## 2023-03-27 NOTE — ED PROVIDER NOTES
Encounter Date: 3/26/2023       History     Chief Complaint   Patient presents with    Abdominal Pain     Found out pregnant 3 weeks ago, also dizzy and weakness.      Patient is a 22-year-old female presents emergency room complaints of lower abdominal pain, nausea, vomiting, diarrhea.  Patient reports symptoms of diarrhea began today.  Denies vaginal bleeding.  Reports recently found out that she was pregnant, last menstrual cycle was 02/10/2023 (6w2d).  Patient has a .  Patient is currently on prenatal vitamins, has an OB appointment scheduled for 2023.  Patient reports since being pregnant, she has had nausea with feelings of lightheadedness and weakness.    The history is provided by the patient.   Review of patient's allergies indicates:  No Known Allergies  History reviewed. No pertinent past medical history.  History reviewed. No pertinent surgical history.  Family History   Problem Relation Age of Onset    No Known Problems Mother     No Known Problems Father      Social History     Tobacco Use    Smoking status: Never    Smokeless tobacco: Never   Substance Use Topics    Alcohol use: Never    Drug use: Never     Review of Systems   Constitutional:  Negative for chills, fatigue and fever.   HENT:  Negative for ear pain, rhinorrhea and sore throat.    Eyes:  Negative for photophobia and pain.   Respiratory:  Negative for cough, shortness of breath and wheezing.    Cardiovascular:  Negative for chest pain.   Gastrointestinal:  Positive for nausea and vomiting. Negative for abdominal pain and diarrhea.   Genitourinary:  Negative for dysuria.   Neurological:  Negative for dizziness, weakness and headaches.   All other systems reviewed and are negative.    Physical Exam     Initial Vitals [23 2108]   BP Pulse Resp Temp SpO2   120/66 77 16 98.7 °F (37.1 °C) 100 %      MAP       --         Physical Exam    Nursing note and vitals reviewed.  Constitutional: She appears well-developed and  well-nourished. No distress.   HENT:   Head: Normocephalic and atraumatic.   Eyes: Conjunctivae and EOM are normal. Pupils are equal, round, and reactive to light.   Neck: Neck supple.   Normal range of motion.  Cardiovascular:  Normal rate, regular rhythm, normal heart sounds and intact distal pulses.           Pulmonary/Chest: Breath sounds normal. No respiratory distress. She has no wheezes. She has no rhonchi. She has no rales.   Abdominal: Abdomen is soft. Bowel sounds are normal. She exhibits no distension. There is abdominal tenderness.   Mild suprapubic tenderness.  No rebound or guarding. There is no rebound and no guarding.   Musculoskeletal:         General: Normal range of motion.      Cervical back: Normal range of motion and neck supple.     Neurological: She is alert and oriented to person, place, and time.   Skin: Skin is warm and dry. Capillary refill takes less than 2 seconds. No erythema.   Psychiatric: She has a normal mood and affect. Her behavior is normal. Judgment and thought content normal.       ED Course   Procedures  Labs Reviewed   URINALYSIS, REFLEX TO URINE CULTURE - Abnormal; Notable for the following components:       Result Value    Appearance, UA Turbid (*)     Leukocyte Esterase,  (*)     WBC, UA 6-10 (*)     Squamous Epithelial Cells, UA Many (*)     Mucous, UA Occ (*)     All other components within normal limits   COMPREHENSIVE METABOLIC PANEL - Abnormal; Notable for the following components:    Globulin 3.7 (*)     All other components within normal limits   HCG, QUANTITATIVE - Abnormal; Notable for the following components:    Beta Human Chorionic Gonadotropin Quantitative 66,050.51 (*)     All other components within normal limits   CBC WITH DIFFERENTIAL - Abnormal; Notable for the following components:    MCV 79.8 (*)     MCH 25.9 (*)     MCHC 32.5 (*)     All other components within normal limits   POCT URINE PREGNANCY - Abnormal; Notable for the following  components:    POC Preg Test, Ur Positive (*)     All other components within normal limits   CBC W/ AUTO DIFFERENTIAL    Narrative:     The following orders were created for panel order CBC Auto Differential.  Procedure                               Abnormality         Status                     ---------                               -----------         ------                     CBC with Differential[[id number]]        Abnormal            Final result                 Please view results for these tests on the individual orders.   EXTRA TUBES    Narrative:     The following orders were created for panel order EXTRA TUBES.  Procedure                               Abnormality         Status                     ---------                               -----------         ------                     Light Blue Top Hold[[id number]]                              In process                 Light Green Top Hold[[id number]]                             In process                 Lavender Top Hold[[id number]]                                In process                   Please view results for these tests on the individual orders.   LIGHT BLUE TOP HOLD   LIGHT GREEN TOP HOLD   LAVENDER TOP HOLD   GROUP & RH          Imaging Results    None          Medications   metoclopramide HCl tablet 10 mg (10 mg Oral Given 3/26/23 2309)     Medical Decision Making:   Initial Assessment:   Patient overall appears well in no acute distress.  Ambulates easily without difficulty.  Currently has stable vital signs, afebrile, with no vaginal bleeding.  Will obtain laboratory evaluation including a beta hCG level.  Will continue to monitor.           ED Course as of 03/26/23 2311   Sun Mar 26, 2023   2218 WBC: 7.9 [MW]   2218 RBC: 4.86 [MW]   2218 Hemoglobin: 12.6 [MW]   2218 Hematocrit: 38.8 [MW]   2218 Platelets: 188 [MW]   2218 Sodium: 136 [MW]   2218 Potassium: 3.9 [MW]   2218 Chloride: 105 [MW]   2218 CO2: 22 [MW]   2218 Glucose: 85 [MW]   2218  BUN: 11.5 [MW]   2218 Creatinine: 0.73 [MW]   2218 Albumin: 4.0 [MW]   2229 Sodium: 136 [MW]   2229 Potassium: 3.9 [MW]   2229 Chloride: 105 [MW]   2229 CO2: 22 [MW]   2229 Glucose: 85 [MW]   2229 BUN: 11.5 [MW]   2229 Creatinine: 0.73 [MW]   2230 Preg Test, Ur(!): Positive [MW]   2230 Color, UA: Light-Yellow [MW]   2230 Appearance, UA(!): Turbid [MW]   2230 Leukocytes, UA(!): 250 [MW]   2230 WBC, UA(!): 6-10 [MW]   2230 Squamous Epithelial Cells, UA(!): Many [MW]   2230 Bacteria, UA: None Seen [MW]   2230 Mucous, UA(!): Occ [MW]   2230 WBC: 7.9 [MW]   2230 Hemoglobin: 12.6 [MW]   2230 Hematocrit: 38.8 [MW]   2230 Platelets: 188 [MW]   2232 White count is 7.9.  Hemoglobin 12.6 and hematocrit 38.8, plt 188.  Normal electrolytes.  No ketones on urinalysis.  Patient has 06/10/2010 white blood cells with 250 of leukocyte esterase with many squamous epithelium, no signs of urinary tract infection. [MW]   2236 HCG Quant(!): 66,050.51 [MW]   2307 Discussed results with the patient.  Unfortunately no beds are currently available, the patient does not appear to be overly dehydrated.  Discussed with patient, and will treat symptomatically with oral medication.  Patient will follow-up with OBGYN.  ER precautions given for any acute worsening. [MW]      ED Course User Index  [MW] Larry Sims MD                   Clinical Impression:   Final diagnoses:  [O21.9] Nausea and vomiting during pregnancy prior to 22 weeks gestation (Primary)        ED Disposition Condition    Discharge Stable          ED Prescriptions       Medication Sig Dispense Start Date End Date Auth. Provider    doxylamine-pyridoxine, vit B6, (DICLEGIS) 10-10 mg TbEC Take 2 tablets by mouth nightly as needed (nausea). 30 tablet 3/26/2023 -- Larry Sims MD          Follow-up Information       Follow up With Specialties Details Why Contact Info [facility] Emergency Medicine  As needed, If symptoms worsen [address]  [facility] [address]  [phone]    Jose Leslie Jr., MD Obstetrics and Gynecology   [facility] [address]  [phone]               Larry Sims MD  03/26/23 9151

## 2023-05-31 ENCOUNTER — LAB VISIT (OUTPATIENT)
Dept: LAB | Facility: HOSPITAL | Age: 23
End: 2023-05-31
Attending: OBSTETRICS & GYNECOLOGY
Payer: MEDICAID

## 2023-05-31 DIAGNOSIS — Z34.80 PRENATAL CARE, SUBSEQUENT PREGNANCY: Primary | ICD-10-CM

## 2023-05-31 LAB
ERYTHROCYTE [DISTWIDTH] IN BLOOD BY AUTOMATED COUNT: 13.1 % (ref 11.5–17)
GROUP & RH: NORMAL
HBV SURFACE AG SERPL QL IA: NONREACTIVE
HCT VFR BLD AUTO: 34.5 % (ref 37–47)
HCV AB SERPL QL IA: NONREACTIVE
HGB BLD-MCNC: 11.2 G/DL (ref 12–16)
HIV 1+2 AB+HIV1 P24 AG SERPL QL IA: NONREACTIVE
INDIRECT COOMBS GEL: NORMAL
MCH RBC QN AUTO: 26.4 PG (ref 27–31)
MCHC RBC AUTO-ENTMCNC: 32.5 G/DL (ref 33–36)
MCV RBC AUTO: 81.2 FL (ref 80–94)
NRBC BLD AUTO-RTO: 0 %
PLATELET # BLD AUTO: 182 X10(3)/MCL (ref 130–400)
PMV BLD AUTO: 10.5 FL (ref 7.4–10.4)
RBC # BLD AUTO: 4.25 X10(6)/MCL (ref 4.2–5.4)
SPECIMEN OUTDATE: NORMAL
T PALLIDUM AB SER QL: NONREACTIVE
TSH SERPL-ACNC: 0.33 UIU/ML (ref 0.35–4.94)
WBC # SPEC AUTO: 7.59 X10(3)/MCL (ref 4.5–11.5)

## 2023-05-31 PROCEDURE — 81511 FTL CGEN ABNOR FOUR ANAL: CPT

## 2023-05-31 PROCEDURE — 85027 COMPLETE CBC AUTOMATED: CPT

## 2023-05-31 PROCEDURE — 85660 RBC SICKLE CELL TEST: CPT

## 2023-05-31 PROCEDURE — 36415 COLL VENOUS BLD VENIPUNCTURE: CPT

## 2023-05-31 PROCEDURE — 86803 HEPATITIS C AB TEST: CPT

## 2023-05-31 PROCEDURE — 86780 TREPONEMA PALLIDUM: CPT

## 2023-05-31 PROCEDURE — 86900 BLOOD TYPING SEROLOGIC ABO: CPT | Performed by: OBSTETRICS & GYNECOLOGY

## 2023-05-31 PROCEDURE — 84443 ASSAY THYROID STIM HORMONE: CPT

## 2023-05-31 PROCEDURE — 86762 RUBELLA ANTIBODY: CPT

## 2023-05-31 PROCEDURE — 87340 HEPATITIS B SURFACE AG IA: CPT

## 2023-05-31 PROCEDURE — 87389 HIV-1 AG W/HIV-1&-2 AB AG IA: CPT

## 2023-05-31 PROCEDURE — 87088 URINE BACTERIA CULTURE: CPT

## 2023-05-31 PROCEDURE — 81222 CFTR GENE DUP/DELET VARIANTS: CPT | Mod: 59

## 2023-06-01 LAB
HGB S BLD QL SOLY: NEGATIVE
RUBV IGG SERPL IA-ACNC: 1.1
RUBV IGG SERPL QL IA: POSITIVE

## 2023-06-02 LAB
# FETUSES: 1
2ND TRIMESTER 4 SCREEN SERPL-IMP: NORMAL
AFP ADJ MOM SERPL: 0.8 MOM
AFP SERPL IA-MCNC: 31.1 NG/ML
AGE AT DELIVERY: NORMAL
B-HCG ADJ MOM SERPL: 1.63 MOM
BACTERIA UR CULT: NORMAL
CHORION TYPE: NORMAL
COLLECT DATE: NORMAL
CURRENT SMOKER: NORMAL
FET TS 21 RISK FROM MAT AGE: NORMAL
GA METHOD: NORMAL
GA US.COMPOSITE.EST: NORMAL WK,D
HCG SERPL IA-ACNC: 78.6 IU/ML
HX OF NTD QL: NO
HX OF NTD QL: NO
HX OF TRISOMY 21 QL: NO
IDDM PATIENT QL: NO
INHIBIN A ADJ MOM SERPL: 1.41 MOM
INHIBIN SERPL-MCNC: 201 PG/ML
IVF PREGNANCY: NO
LABORATORY COMMENT REPORT: NORMAL
M PHYSICIAN PHONE NUMBER: NORMAL
MATERNAL RISK FACTORS: NORMAL
NEURAL TUBE DEFECT RISK FETUS: NORMAL %
RECOM F/U: NORMAL
TEST PERFORMANCE INFO SPEC: NORMAL
TS 18 RISK FETUS: NORMAL
TS 21 RISK FETUS: NORMAL
U ESTRIOL ADJ MOM SERPL: 1.32 MOM
U ESTRIOL SERPL-MCNC: 1.03 NG/ML

## 2023-06-12 ENCOUNTER — HOSPITAL ENCOUNTER (EMERGENCY)
Facility: HOSPITAL | Age: 23
Discharge: HOME | End: 2023-06-12
Attending: INTERNAL MEDICINE
Payer: MEDICAID

## 2023-06-12 VITALS
OXYGEN SATURATION: 100 % | HEART RATE: 76 BPM | RESPIRATION RATE: 16 BRPM | DIASTOLIC BLOOD PRESSURE: 61 MMHG | SYSTOLIC BLOOD PRESSURE: 107 MMHG | TEMPERATURE: 98 F | BODY MASS INDEX: 21.97 KG/M2 | WEIGHT: 136.69 LBS | HEIGHT: 66 IN

## 2023-06-12 DIAGNOSIS — O23.42 URINARY TRACT INFECTION IN MOTHER DURING SECOND TRIMESTER OF PREGNANCY: Primary | ICD-10-CM

## 2023-06-12 LAB
ALBUMIN SERPL-MCNC: 3.9 G/DL (ref 3.5–5)
ALBUMIN/GLOB SERPL: 0.8 RATIO (ref 1.1–2)
ALP SERPL-CCNC: 51 UNIT/L (ref 40–150)
ALT SERPL-CCNC: 18 UNIT/L (ref 0–55)
APPEARANCE UR: ABNORMAL
AST SERPL-CCNC: 19 UNIT/L (ref 5–34)
BACTERIA #/AREA URNS AUTO: ABNORMAL /HPF
BASOPHILS # BLD AUTO: 0.02 X10(3)/MCL
BASOPHILS NFR BLD AUTO: 0.2 %
BILIRUB UR QL STRIP.AUTO: NEGATIVE MG/DL
BILIRUBIN DIRECT+TOT PNL SERPL-MCNC: 0.5 MG/DL
BUN SERPL-MCNC: 9.7 MG/DL (ref 7–18.7)
CALCIUM SERPL-MCNC: 9.5 MG/DL (ref 8.4–10.2)
CASTS URNS MICRO: ABNORMAL /LPF
CHLORIDE SERPL-SCNC: 105 MMOL/L (ref 98–107)
CO2 SERPL-SCNC: 20 MMOL/L (ref 22–29)
COLOR UR: YELLOW
CREAT SERPL-MCNC: 0.71 MG/DL (ref 0.55–1.02)
EOSINOPHIL # BLD AUTO: 0.03 X10(3)/MCL (ref 0–0.9)
EOSINOPHIL NFR BLD AUTO: 0.3 %
ERYTHROCYTE [DISTWIDTH] IN BLOOD BY AUTOMATED COUNT: 13.5 % (ref 11.5–17)
GENETIC VARIANT DETAILS BLD/T: NORMAL
GENETICIST REVIEW: NORMAL
GFR SERPLBLD CREATININE-BSD FMLA CKD-EPI: >60 MLS/MIN/1.73/M2
GLOBULIN SER-MCNC: 4.7 GM/DL (ref 2.4–3.5)
GLUCOSE SERPL-MCNC: 75 MG/DL (ref 74–100)
GLUCOSE UR QL STRIP.AUTO: NORMAL MG/DL
HCT VFR BLD AUTO: 38.5 % (ref 37–47)
HGB BLD-MCNC: 12.4 G/DL (ref 12–16)
HYALINE CASTS #/AREA URNS LPF: ABNORMAL /LPF
IMM GRANULOCYTES # BLD AUTO: 0.04 X10(3)/MCL (ref 0–0.04)
IMM GRANULOCYTES NFR BLD AUTO: 0.4 %
KETONES UR QL STRIP.AUTO: ABNORMAL MG/DL
LAB TEST METHOD: NORMAL
LEUKOCYTE ESTERASE UR QL STRIP.AUTO: 250 UNIT/L
LYMPHOCYTES # BLD AUTO: 0.81 X10(3)/MCL (ref 0.6–4.6)
LYMPHOCYTES NFR BLD AUTO: 8.2 %
MCH RBC QN AUTO: 25.8 PG (ref 27–31)
MCHC RBC AUTO-ENTMCNC: 32.2 G/DL (ref 33–36)
MCV RBC AUTO: 80 FL (ref 80–94)
MOL DX INTERP BLD/T QL: NEGATIVE
MONOCYTES # BLD AUTO: 0.56 X10(3)/MCL (ref 0.1–1.3)
MONOCYTES NFR BLD AUTO: 5.7 %
MUCOUS THREADS URNS QL MICRO: ABNORMAL /LPF
NEUTROPHILS # BLD AUTO: 8.36 X10(3)/MCL (ref 2.1–9.2)
NEUTROPHILS NFR BLD AUTO: 85.2 %
NITRITE UR QL STRIP.AUTO: NEGATIVE
NRBC BLD AUTO-RTO: 0 %
PH UR STRIP.AUTO: 7.5 [PH]
PLATELET # BLD AUTO: 196 X10(3)/MCL (ref 130–400)
PMV BLD AUTO: 11.4 FL (ref 7.4–10.4)
POCT GLUCOSE: 75 MG/DL (ref 70–110)
POTASSIUM SERPL-SCNC: 3.6 MMOL/L (ref 3.5–5.1)
PROT SERPL-MCNC: 8.6 GM/DL (ref 6.4–8.3)
PROT UR QL STRIP.AUTO: ABNORMAL MG/DL
PROVIDER SIGNING NAME: NORMAL
RBC # BLD AUTO: 4.81 X10(6)/MCL (ref 4.2–5.4)
RBC #/AREA URNS AUTO: ABNORMAL /HPF
RBC UR QL AUTO: NEGATIVE UNIT/L
SODIUM SERPL-SCNC: 136 MMOL/L (ref 136–145)
SP GR UR STRIP.AUTO: 1.03
SPECIMEN SOURCE: NORMAL
SQUAMOUS #/AREA URNS LPF: ABNORMAL /HPF
UNIDENT CRYS #/AREA URNS HPF: ABNORMAL /HPF
UROBILINOGEN UR STRIP-ACNC: NORMAL MG/DL
WBC # SPEC AUTO: 9.82 X10(3)/MCL (ref 4.5–11.5)
WBC #/AREA URNS AUTO: ABNORMAL /HPF

## 2023-06-12 PROCEDURE — 80053 COMPREHEN METABOLIC PANEL: CPT | Performed by: NURSE PRACTITIONER

## 2023-06-12 PROCEDURE — 85025 COMPLETE CBC W/AUTO DIFF WBC: CPT | Performed by: NURSE PRACTITIONER

## 2023-06-12 PROCEDURE — 81001 URINALYSIS AUTO W/SCOPE: CPT | Performed by: NURSE PRACTITIONER

## 2023-06-12 PROCEDURE — 82962 GLUCOSE BLOOD TEST: CPT

## 2023-06-12 PROCEDURE — 87088 URINE BACTERIA CULTURE: CPT | Performed by: NURSE PRACTITIONER

## 2023-06-12 PROCEDURE — 96360 HYDRATION IV INFUSION INIT: CPT

## 2023-06-12 PROCEDURE — 99284 EMERGENCY DEPT VISIT MOD MDM: CPT | Mod: 25

## 2023-06-12 PROCEDURE — 25000003 PHARM REV CODE 250: Performed by: NURSE PRACTITIONER

## 2023-06-12 RX ORDER — CEPHALEXIN 500 MG/1
500 CAPSULE ORAL EVERY 12 HOURS
Qty: 14 CAPSULE | Refills: 0 | Status: SHIPPED | OUTPATIENT
Start: 2023-06-12 | End: 2023-06-19

## 2023-06-12 RX ORDER — METOCLOPRAMIDE 10 MG/1
10 TABLET ORAL EVERY 6 HOURS PRN
Qty: 16 TABLET | Refills: 0 | Status: SHIPPED | OUTPATIENT
Start: 2023-06-12 | End: 2023-06-16

## 2023-06-12 RX ADMIN — SODIUM CHLORIDE 1000 ML: 9 INJECTION, SOLUTION INTRAVENOUS at 08:06

## 2023-06-12 NOTE — ED PROVIDER NOTES
Encounter Date: 2023       History     Chief Complaint   Patient presents with    Weakness     Generalized weakness x 2 days with intermittent lower abdominal cramping. Currently pregnant; 17 w 3 d. Reports frequent vomiting.     Pt is a 22 y.o. female who presents to the [facility] ED complaining of suprapubic cramping x 2 days. Denies vaginal bleeding, vaginal discharge, chest pain, SOB, dizziness, fever, dysuria, or loss of bowel or bladder control. Pt is currently pregnant. . Reports fetal movement, unchanged since start of symptoms. Pt requesting to eat during assessment, a bag of hot fries noted in her hands.     Review of patient's allergies indicates:  No Known Allergies  History reviewed. No pertinent past medical history.  History reviewed. No pertinent surgical history.  Family History   Problem Relation Age of Onset    No Known Problems Mother     No Known Problems Father      Social History     Tobacco Use    Smoking status: Never    Smokeless tobacco: Never   Substance Use Topics    Alcohol use: Never    Drug use: Never     Review of Systems   Constitutional:  Negative for chills, diaphoresis, fatigue and fever.   HENT:  Negative for facial swelling, rhinorrhea, sinus pressure, sinus pain, sore throat and trouble swallowing.    Respiratory:  Negative for cough, chest tightness, shortness of breath and wheezing.    Cardiovascular:  Negative for chest pain, palpitations and leg swelling.   Gastrointestinal:  Positive for abdominal pain. Negative for diarrhea, nausea and vomiting.   Genitourinary:  Negative for dysuria, flank pain, frequency, hematuria and urgency.   Musculoskeletal:  Negative for arthralgias, back pain, joint swelling and myalgias.   Skin:  Negative for color change and rash.   Neurological:  Negative for dizziness, syncope, weakness and light-headedness.   Hematological:  Does not bruise/bleed easily.   All other systems reviewed and are negative.    Physical Exam     Initial Vitals  [06/12/23 1839]   BP Pulse Resp Temp SpO2   107/61 76 16 97.9 °F (36.6 °C) 100 %      MAP       --         Physical Exam    Nursing note and vitals reviewed.  Constitutional: She appears well-developed and well-nourished.   HENT:   Head: Normocephalic and atraumatic.   Nose: Nose normal.   Mouth/Throat: Oropharynx is clear and moist.   Eyes: Conjunctivae and EOM are normal. Pupils are equal, round, and reactive to light.   Neck: Neck supple.   Normal range of motion.  Cardiovascular:  Normal rate, regular rhythm, normal heart sounds and intact distal pulses.           Pulmonary/Chest: Effort normal and breath sounds normal. No respiratory distress. She has no wheezes. She has no rhonchi. She has no rales. She exhibits no tenderness.   Abdominal: Abdomen is soft and flat. Bowel sounds are normal. She exhibits no distension. There is abdominal tenderness in the suprapubic area. There is no rebound, no guarding, no tenderness at McBurney's point and negative Sadler's sign. negative psoas sign  Musculoskeletal:         General: Normal range of motion.      Cervical back: Normal range of motion and neck supple.     Neurological: She is alert and oriented to person, place, and time. She has normal strength and normal reflexes.   Skin: Skin is warm and dry. Capillary refill takes less than 2 seconds.   Psychiatric: She has a normal mood and affect. Her speech is normal and behavior is normal. Judgment and thought content normal.       ED Course   Procedures  Labs Reviewed   COMPREHENSIVE METABOLIC PANEL - Abnormal; Notable for the following components:       Result Value    Carbon Dioxide 20 (*)     Protein Total 8.6 (*)     Globulin 4.7 (*)     Albumin/Globulin Ratio 0.8 (*)     All other components within normal limits   URINALYSIS, REFLEX TO URINE CULTURE - Abnormal; Notable for the following components:    Appearance, UA Turbid (*)     Protein, UA 1+ (*)     Ketones, UA 1+ (*)     Leukocyte Esterase,  (*)      WBC, UA 21-50 (*)     Bacteria, UA Trace (*)     Squamous Epithelial Cells, UA Many (*)     Mucous, UA Moderate (*)     Unclassified Cast, UA 3-5 (*)     Unclassified Crystal, UA Occ (*)     All other components within normal limits   CBC WITH DIFFERENTIAL - Abnormal; Notable for the following components:    MCH 25.8 (*)     MCHC 32.2 (*)     MPV 11.4 (*)     All other components within normal limits   CULTURE, URINE   CBC W/ AUTO DIFFERENTIAL    Narrative:     The following orders were created for panel order CBC auto differential.  Procedure                               Abnormality         Status                     ---------                               -----------         ------                     CBC with Differential[[id number]]        Abnormal            Final result                 Please view results for these tests on the individual orders.   EXTRA TUBES    Narrative:     The following orders were created for panel order EXTRA TUBES.  Procedure                               Abnormality         Status                     ---------                               -----------         ------                     Light Blue Top Hold[[id number]]                              In process                 Gold Top Hold[[id number]]                                    In process                   Please view results for these tests on the individual orders.   LIGHT BLUE TOP HOLD   GOLD TOP HOLD   POCT GLUCOSE          Imaging Results    None          Medications   sodium chloride 0.9% bolus 1,000 mL 1,000 mL (1,000 mLs Intravenous New Bag 6/12/23 2007)     Medical Decision Making:   Differential Diagnosis:   Cystitis    Clinical Tests:   Lab Tests: Ordered and Reviewed  ED Management:  8:47 PM Reassessed patient at this time. Reports condition has improved. Discussed with patient all pertinent ED information and results. Discussed diagnosis and treatment plan with patient. Follow up instructions and return to ED  instruction have been given. All questions and concerns were addressed at this time. Patient voices understanding of information and instructions. Patient is comfortable with plan and discharge. Patient is stable for discharge.                           Clinical Impression:   Final diagnoses:  [O23.42] Urinary tract infection in mother during second trimester of pregnancy (Primary)        ED Disposition Condition    Discharge Stable          ED Prescriptions       Medication Sig Dispense Start Date End Date Auth. Provider    metoclopramide HCl (REGLAN) 10 MG tablet Take 1 tablet (10 mg total) by mouth every 6 (six) hours as needed (nausea). 16 tablet 6/12/2023 6/16/2023 BRITNEY Melendez Jr.    cephALEXin (KEFLEX) 500 MG capsule Take 1 capsule (500 mg total) by mouth every 12 (twelve) hours. for 7 days 14 capsule 6/12/2023 6/19/2023 BRITNEY Melendez Jr.          Follow-up Information       Follow up With Specialties Details Why Contact Info    Anne Wilson, DO Family Medicine In 3 days  [address] [facility] [address]  [phone]      [facility] Emergency Medicine In 3 days As needed, If symptoms worsen [address] [facility] [id number] [phone]             BRITNEY Melendez Jr.  06/12/23 2049

## 2023-06-13 ENCOUNTER — PATIENT MESSAGE (OUTPATIENT)
Dept: RESEARCH | Facility: HOSPITAL | Age: 23
End: 2023-06-13
Payer: MEDICAID

## 2023-06-13 NOTE — DISCHARGE INSTRUCTIONS
Follow up with your primary care physician in 3-5 days for follow up evaluation.  Take medication as prescribed.  Increase oral fluids.  Present to nearest ED immediately for decreased fetal movement, pelvic pain, or vaginal bleeding.

## 2023-06-15 LAB — BACTERIA UR CULT: NO GROWTH

## 2023-06-27 ENCOUNTER — PATIENT MESSAGE (OUTPATIENT)
Dept: RESEARCH | Facility: HOSPITAL | Age: 23
End: 2023-06-27
Payer: MEDICAID

## 2023-07-05 ENCOUNTER — PATIENT MESSAGE (OUTPATIENT)
Dept: RESEARCH | Facility: HOSPITAL | Age: 23
End: 2023-07-05
Payer: MEDICAID

## 2023-07-11 ENCOUNTER — PATIENT MESSAGE (OUTPATIENT)
Dept: RESEARCH | Facility: HOSPITAL | Age: 23
End: 2023-07-11
Payer: MEDICAID

## 2023-07-14 ENCOUNTER — OFFICE VISIT (OUTPATIENT)
Dept: URGENT CARE | Facility: CLINIC | Age: 23
End: 2023-07-14
Payer: MEDICAID

## 2023-07-14 VITALS
WEIGHT: 144.19 LBS | HEART RATE: 67 BPM | RESPIRATION RATE: 18 BRPM | HEIGHT: 64 IN | BODY MASS INDEX: 24.62 KG/M2 | TEMPERATURE: 98 F | OXYGEN SATURATION: 100 %

## 2023-07-14 DIAGNOSIS — J06.9 UPPER RESPIRATORY TRACT INFECTION, UNSPECIFIED TYPE: ICD-10-CM

## 2023-07-14 DIAGNOSIS — R09.81 SINUS CONGESTION: Primary | ICD-10-CM

## 2023-07-14 LAB
CTP QC/QA: YES
CTP QC/QA: YES
POC MOLECULAR INFLUENZA A AGN: NEGATIVE
POC MOLECULAR INFLUENZA B AGN: NEGATIVE
SARS-COV-2 RDRP RESP QL NAA+PROBE: NEGATIVE

## 2023-07-14 PROCEDURE — 87635 SARS-COV-2 COVID-19 AMP PRB: CPT | Mod: PBBFAC | Performed by: FAMILY MEDICINE

## 2023-07-14 PROCEDURE — 87502 INFLUENZA DNA AMP PROBE: CPT | Mod: PBBFAC | Performed by: FAMILY MEDICINE

## 2023-07-14 PROCEDURE — 99213 PR OFFICE/OUTPT VISIT, EST, LEVL III, 20-29 MIN: ICD-10-PCS | Mod: S$PBB,,, | Performed by: FAMILY MEDICINE

## 2023-07-14 PROCEDURE — 99214 OFFICE O/P EST MOD 30 MIN: CPT | Mod: PBBFAC | Performed by: FAMILY MEDICINE

## 2023-07-14 PROCEDURE — 99213 OFFICE O/P EST LOW 20 MIN: CPT | Mod: S$PBB,,, | Performed by: FAMILY MEDICINE

## 2023-07-14 NOTE — PROGRESS NOTES
"Subjective:      Patient ID: Shani Stephens is a 23 y.o. female.    Vitals:  height is 5' 4.17" (1.63 m) and weight is 65.4 kg (144 lb 2.9 oz). Her temperature is 98.4 °F (36.9 °C). Her pulse is 67. Her respiration is 18 and oxygen saturation is 100%.     Chief Complaint: Sinus Problem (No taste or smell  x 2days)    Possible COVID exposure recently, having nasal congestion, clear rhinorrhea, no cough, no chest discomfort.  No rash.  Thinks taste is different.  States she is 6 months pregnant.  Good fetal movement, no vaginal bleeding or discharge.  No abdominal pain.    Sinus Problem    ROS   Objective:     Physical Exam   Constitutional: She appears well-developed.  Non-toxic appearance. She does not appear ill. No distress.   HENT:   Head: Atraumatic.   Nose: No purulent discharge. Right sinus exhibits no maxillary sinus tenderness and no frontal sinus tenderness. Left sinus exhibits no maxillary sinus tenderness and no frontal sinus tenderness.   Eyes: Right eye exhibits no discharge. Left eye exhibits no discharge. Extraocular movement intact   Neck: Neck supple.   Cardiovascular: Regular rhythm.   Pulmonary/Chest: Effort normal and breath sounds normal. No respiratory distress. She has no wheezes. She has no rales.   Abdominal: She exhibits no distension. There is no abdominal tenderness.      Comments: Gravid   Lymphadenopathy:     She has no cervical adenopathy.   Neurological: She is alert.   Skin: Skin is warm, dry and not diaphoretic.   Psychiatric: Her behavior is normal.   Nursing note and vitals reviewed.  Results for orders placed or performed in visit on 07/14/23   POCT COVID-19 Rapid Screening   Result Value Ref Range    POC Rapid COVID Negative Negative     Acceptable Yes    POCT Influenza A/B Molecular   Result Value Ref Range    POC Molecular Influenza A Ag Negative Negative, Not Reported    POC Molecular Influenza B Ag Negative Negative, Not Reported     " Acceptable Yes        Assessment:     1. Sinus congestion    2. Upper respiratory tract infection, unspecified type        Plan:   COVID test is negative.  Discussed potential false negatives.  Recommended that she take a home test tomorrow, that is negative then repeat the next day.  Do not take any medications unless approved by OB provider.  ER precautions    Sinus congestion  -     POCT COVID-19 Rapid Screening  -     POCT Influenza A/B Molecular    Upper respiratory tract infection, unspecified type

## 2023-08-02 ENCOUNTER — LAB VISIT (OUTPATIENT)
Dept: LAB | Facility: HOSPITAL | Age: 23
End: 2023-08-02
Attending: OBSTETRICS & GYNECOLOGY
Payer: MEDICAID

## 2023-08-02 DIAGNOSIS — Z34.82 PRENATAL CARE, SUBSEQUENT PREGNANCY IN SECOND TRIMESTER: Primary | ICD-10-CM

## 2023-08-02 LAB
GLUCOSE 1H P 100 G GLC PO SERPL-MCNC: 94 MG/DL (ref 100–180)
HCT VFR BLD AUTO: 32.4 % (ref 37–47)
HGB BLD-MCNC: 10.7 G/DL (ref 12–16)

## 2023-08-02 PROCEDURE — 36415 COLL VENOUS BLD VENIPUNCTURE: CPT

## 2023-08-02 PROCEDURE — 85014 HEMATOCRIT: CPT

## 2023-08-02 PROCEDURE — 82950 GLUCOSE TEST: CPT

## 2023-08-15 ENCOUNTER — HOSPITAL ENCOUNTER (EMERGENCY)
Facility: HOSPITAL | Age: 23
Discharge: HOME | End: 2023-08-15
Payer: MEDICAID

## 2023-08-15 VITALS — RESPIRATION RATE: 18 BRPM | WEIGHT: 136 LBS | BODY MASS INDEX: 23.22 KG/M2 | HEIGHT: 64 IN | TEMPERATURE: 98 F

## 2023-08-15 PROCEDURE — 99284 EMERGENCY DEPT VISIT MOD MDM: CPT

## 2023-08-15 RX ORDER — FLUCONAZOLE 150 MG/1
150 TABLET ORAL DAILY
Qty: 1 TABLET | Refills: 0 | Status: SHIPPED | OUTPATIENT
Start: 2023-08-15 | End: 2023-08-16

## 2023-08-15 NOTE — ED PROVIDER NOTES
"       [facility] NOTE  [facility]     Admit Date: 8/15/2023  [facility] Physician: Hailey Orona  Primary OBGYN: Dr. Jose Leslie    Admit Diagnosis/Chief Complaint: Abdominal Pain  Discharge Diagnosis:  yeast vulvuvaginitis    Chief Complaint   Patient presents with    Abdominal Pain     Iup at 26.4 with c/o abd pain since last night       Hospital Course:  Shani Stephens is a 23 y.o.  at 26w4d presents complaining of abd pain.  This IUP is complicated by none.    Patient denies vaginal bleeding, leakage of fluid, contractions, headache, vision changes, RUQ pain, dysuria, fever, and nausea/vomiting.  Fetal Movement: normal.    Temp 98.2 °F (36.8 °C) (Oral)   Resp 18   Ht 5' 4" (1.626 m)   Wt 61.7 kg (136 lb)   LMP 2022   Breastfeeding No   BMI 23.34 kg/m²   Temp:  [98.2 °F (36.8 °C)] 98.2 °F (36.8 °C)  Resp:  [18] 18    General: in no apparent distress oriented times 3 normal vitals  Cardiovascular: regular rate and rhythm no murmurs  Respiratory: unlabored  Abdominal: soft, nontender, nondistended, no abnormal masses, no epigastric pain FHT present  Back: lumbar tenderness absent CVA tenderness none suprapubic tenderness absent  Extremeties no redness or tenderness in the calves or thighs no edema    SVE (PeriWATCH)  Dilation (cm): 0  Effacement (%): 0  Station: -4  Cervical Position: Posterior  Cervical Consistency: Firm  Examined by:: Yeyo Andrews in room: Shreya  Vaginal Exam Comments: SSE thick white yeast dc and on labia and vulva as well  Simpson Score: 0  Simplified Simpson Score: 0         EFM: appropriate  modBTV, +accel, no decel (reassuring, reactive)  TOCO: none      Medical Decision Making:      LABS:   No results found for this or any previous visit (from the past 24 hour(s)).    Imaging Results    None          ASSESMENT and clinical impression: Shani Stephens is a 23 y.o.   at 26w4d with yeast " vulvovaginitis    Status:Stable    Disposition:  discharged to home    Medications:   Rx diflucan    Patient Instructions:   - Pt was given routine pregnancy instructions including to return to triage if she had any vaginal bleeding (other than spotting for the next 48hrs), any loss of fluid like her water broke, decreased fetal movement, or contractions Q 5min lasting for 2 or more hours. Pt was also instructed to drink copious water. Patient voiced understanding of all these instructions and was subsequently discharged home. Tylenol use and maternity belt use discussed. All questions answered. Pt left [facility] with good understanding of plan.   Preeclampsia/ROM/labor/fever/decreased FM with C precautions discussed, voiced understanding     She will follow up with her primary OB as scheduled    Hailey Orona MD  OB/GYN Hospitalist  12:59 PM 08/15/2023

## 2023-09-18 ENCOUNTER — CLINICAL SUPPORT (OUTPATIENT)
Dept: URGENT CARE | Facility: CLINIC | Age: 23
End: 2023-09-18
Payer: MEDICAID

## 2023-09-18 VITALS
SYSTOLIC BLOOD PRESSURE: 118 MMHG | DIASTOLIC BLOOD PRESSURE: 69 MMHG | HEIGHT: 64 IN | WEIGHT: 154.63 LBS | BODY MASS INDEX: 26.4 KG/M2 | RESPIRATION RATE: 16 BRPM | TEMPERATURE: 98 F | HEART RATE: 74 BPM | OXYGEN SATURATION: 100 %

## 2023-09-18 DIAGNOSIS — H92.02 LEFT EAR PAIN: ICD-10-CM

## 2023-09-18 DIAGNOSIS — R01.1 SYSTOLIC MURMUR: ICD-10-CM

## 2023-09-18 DIAGNOSIS — H66.92 LEFT OTITIS MEDIA, UNSPECIFIED OTITIS MEDIA TYPE: Primary | ICD-10-CM

## 2023-09-18 PROCEDURE — 99213 OFFICE O/P EST LOW 20 MIN: CPT | Mod: PBBFAC | Performed by: FAMILY MEDICINE

## 2023-09-18 PROCEDURE — 99214 PR OFFICE/OUTPT VISIT, EST, LEVL IV, 30-39 MIN: ICD-10-PCS | Mod: S$PBB,,, | Performed by: FAMILY MEDICINE

## 2023-09-18 PROCEDURE — 99214 OFFICE O/P EST MOD 30 MIN: CPT | Mod: S$PBB,,, | Performed by: FAMILY MEDICINE

## 2023-09-18 RX ORDER — PROMETHAZINE HYDROCHLORIDE 25 MG/1
25 TABLET ORAL EVERY 8 HOURS PRN
COMMUNITY
Start: 2023-08-23 | End: 2024-03-25

## 2023-09-18 RX ORDER — AMOXICILLIN AND CLAVULANATE POTASSIUM 875; 125 MG/1; MG/1
1 TABLET, FILM COATED ORAL 2 TIMES DAILY
Qty: 20 TABLET | Refills: 0 | Status: SHIPPED | OUTPATIENT
Start: 2023-09-18 | End: 2023-09-28

## 2023-09-19 NOTE — PROGRESS NOTES
"Subjective:       Patient ID: Shani Stephens is a 23 y.o. female.    Chief Complaint: Otalgia (Lt ear pain/fullness x 4 days. )      Otalgia       23-year-old female with left ear pain and fullness for 4 days.  Similar to previous ear infections.  Over-the-counter medication has been ineffective.  Review of Systems   HENT:  Positive for ear pain.          Objective:       Vital Signs  Temp: 98.2 °F (36.8 °C)  Pulse: 74  Resp: 16  SpO2: 100 %  BP: 118/69  Height and Weight  Height: 5' 4" (162.6 cm)  Weight: 70.1 kg (154 lb 9.6 oz)  BSA (Calculated - sq m): 1.78 sq meters  BMI (Calculated): 26.5  Weight in (lb) to have BMI = 25: 145.3]  Physical Exam  Constitutional:       Appearance: Normal appearance.   HENT:      Head: Normocephalic and atraumatic.      Right Ear: Tympanic membrane and ear canal normal.      Left Ear: There is impacted cerumen.   Eyes:      Extraocular Movements: Extraocular movements intact.      Conjunctiva/sclera: Conjunctivae normal.   Cardiovascular:      Rate and Rhythm: Normal rate and regular rhythm.      Heart sounds: Murmur (2/6 systolic) heard.   Pulmonary:      Breath sounds: Normal breath sounds.   Skin:     General: Skin is warm and dry.   Neurological:      General: No focal deficit present.      Mental Status: She is alert.   Psychiatric:         Mood and Affect: Mood and affect normal.         Speech: Speech normal.         Behavior: Behavior normal. Behavior is cooperative.         Thought Content: Thought content does not include homicidal or suicidal ideation.         Assessment:       Problem List Items Addressed This Visit    None  Visit Diagnoses       Left otitis media, unspecified otitis media type    -  Primary    Relevant Medications    amoxicillin-clavulanate 875-125mg (AUGMENTIN) 875-125 mg per tablet    Left ear pain        Systolic murmur                Plan:   ER precautions   Use Tylenol for pain   Follow-up with PCP   "

## 2023-10-07 ENCOUNTER — HOSPITAL ENCOUNTER (EMERGENCY)
Facility: HOSPITAL | Age: 23
Discharge: HOME | End: 2023-10-07
Attending: OBSTETRICS & GYNECOLOGY | Admitting: OBSTETRICS & GYNECOLOGY
Payer: MEDICAID

## 2023-10-07 VITALS
DIASTOLIC BLOOD PRESSURE: 67 MMHG | HEART RATE: 69 BPM | RESPIRATION RATE: 20 BRPM | SYSTOLIC BLOOD PRESSURE: 114 MMHG | TEMPERATURE: 98 F

## 2023-10-07 PROCEDURE — 99284 EMERGENCY DEPT VISIT MOD MDM: CPT

## 2023-10-07 NOTE — ED PROVIDER NOTES
[facility] NOTE     Admit Date: 10/7/2023  [facility] Physician: Carl Zuniga  Primary OBGYN: Dr. Jose Leslie    Admit Diagnosis/Chief Complaint: Contractions      Chief Complaint   Patient presents with    Contractions       Hospital Course:  Shani Stephens is a 23 y.o.  at 34w1d presents complaining of contractions  This IUP is complicated by NA.    Patient denies vaginal bleeding and leakage of fluid.  Fetal Movement: normal.    /67 (BP Location: Right arm, Patient Position: Lying)   Pulse 69   Temp 98.2 °F (36.8 °C) (Oral)   Resp 20   LMP 2022   Temp:  [98.2 °F (36.8 °C)] 98.2 °F (36.8 °C)  Pulse:  [69] 69  Resp:  [20] 20  BP: (114)/(67) 114/67    General: in no apparent distress  Abdominal: soft, nontender, nondistended, no abnormal masses, no epigastric pain FHT present  Back: lumbar tenderness absent   CVA tenderness none  Extremeties no redness or tenderness in the calves or thighs no edema    SSE:   SVE:SVE (PeriWATCH)  Dilation (cm): 0  Station: -4  Examined by:: PHILLIP Jordan RN     FHT:  Reactive  TOCO: Contractions none      LABS:   No results found for this or any previous visit (from the past 24 hour(s)).  [unfilled]     Imaging Results    None          ASSESMENT: Shani Stephens is a 23 y.o.   at 34w1d with Contractions  Observation in [facility]  Rule out labor  We will reevaluate cervix if charles  If no contractions or cervical change, will discharge from hospital  Labor precautions reviewed with patient  ER precautions reviewed with patient  Patient was given an opportunity to ask questions  She is to follow-up with her primary care physician        Discharge Diagnosis/Clinical Impression**: Rule Out Labor, Contractions in Pregnancy, False Labor  Status:Stable    Patient Instructions:       - Pt was given routine pregnancy instructions including to return to triage if she had any vaginal bleeding (other than spotting for the next 48hrs), any loss of fluid like her  water broke, decreased fetal movement, or contractions Q 5min lasting for 2 or more hours. Pt was also instructed to drink copious water. Patient voiced understanding of all these instructions and was subsequently discharged home.    She will follow up with her primary OB.      This note was created with the assistance of [employer] voice recognition software. There may be transcription errors as a result of using this technology however minimal. Effort has been made to assure accuracy of transcription but any obvious errors or omissions should be clarified with the author of the document.

## 2023-10-07 NOTE — NURSING
Discharge instructions reviewed with patient, patient verbalized understanding. Ambulated off unit with all personal belongings intact.

## 2023-10-25 LAB — PRENATAL STREP B CULTURE: POSITIVE

## 2023-10-27 ENCOUNTER — HOSPITAL ENCOUNTER (INPATIENT)
Facility: HOSPITAL | Age: 23
LOS: 1 days | Discharge: HOME | DRG: 833 | End: 2023-10-27
Attending: OBSTETRICS & GYNECOLOGY | Admitting: OBSTETRICS & GYNECOLOGY
Payer: MEDICAID

## 2023-10-27 VITALS
OXYGEN SATURATION: 100 % | SYSTOLIC BLOOD PRESSURE: 132 MMHG | BODY MASS INDEX: 28.17 KG/M2 | TEMPERATURE: 98 F | DIASTOLIC BLOOD PRESSURE: 77 MMHG | HEART RATE: 74 BPM | HEIGHT: 64 IN | RESPIRATION RATE: 16 BRPM | WEIGHT: 165 LBS

## 2023-10-27 DIAGNOSIS — Z34.90 PREGNANCY: Primary | ICD-10-CM

## 2023-10-27 PROBLEM — O47.9 FALSE LABOR: Status: ACTIVE | Noted: 2023-10-27

## 2023-10-27 LAB
BASOPHILS # BLD AUTO: 0.02 X10(3)/MCL
BASOPHILS NFR BLD AUTO: 0.2 %
EOSINOPHIL # BLD AUTO: 0.05 X10(3)/MCL (ref 0–0.9)
EOSINOPHIL NFR BLD AUTO: 0.6 %
ERYTHROCYTE [DISTWIDTH] IN BLOOD BY AUTOMATED COUNT: 14.1 % (ref 11.5–17)
GROUP & RH: NORMAL
HCT VFR BLD AUTO: 35.5 % (ref 37–47)
HGB BLD-MCNC: 11.1 G/DL (ref 12–16)
IMM GRANULOCYTES # BLD AUTO: 0.05 X10(3)/MCL (ref 0–0.04)
IMM GRANULOCYTES NFR BLD AUTO: 0.6 %
INDIRECT COOMBS GEL: NORMAL
LYMPHOCYTES # BLD AUTO: 1.48 X10(3)/MCL (ref 0.6–4.6)
LYMPHOCYTES NFR BLD AUTO: 17.6 %
MCH RBC QN AUTO: 25.6 PG (ref 27–31)
MCHC RBC AUTO-ENTMCNC: 31.3 G/DL (ref 33–36)
MCV RBC AUTO: 82 FL (ref 80–94)
MONOCYTES # BLD AUTO: 0.88 X10(3)/MCL (ref 0.1–1.3)
MONOCYTES NFR BLD AUTO: 10.5 %
NEUTROPHILS # BLD AUTO: 5.93 X10(3)/MCL (ref 2.1–9.2)
NEUTROPHILS NFR BLD AUTO: 70.5 %
NRBC BLD AUTO-RTO: 0 %
PLATELET # BLD AUTO: 210 X10(3)/MCL (ref 130–400)
PMV BLD AUTO: 11.1 FL (ref 7.4–10.4)
RBC # BLD AUTO: 4.33 X10(6)/MCL (ref 4.2–5.4)
SPECIMEN OUTDATE: NORMAL
T PALLIDUM AB SER QL: NONREACTIVE
WBC # SPEC AUTO: 8.41 X10(3)/MCL (ref 4.5–11.5)

## 2023-10-27 PROCEDURE — 86780 TREPONEMA PALLIDUM: CPT | Performed by: OBSTETRICS & GYNECOLOGY

## 2023-10-27 PROCEDURE — 11000001 HC ACUTE MED/SURG PRIVATE ROOM

## 2023-10-27 PROCEDURE — 63600175 PHARM REV CODE 636 W HCPCS: Performed by: OBSTETRICS & GYNECOLOGY

## 2023-10-27 PROCEDURE — 99285 EMERGENCY DEPT VISIT HI MDM: CPT

## 2023-10-27 PROCEDURE — 85025 COMPLETE CBC W/AUTO DIFF WBC: CPT | Performed by: OBSTETRICS & GYNECOLOGY

## 2023-10-27 PROCEDURE — 86850 RBC ANTIBODY SCREEN: CPT | Performed by: OBSTETRICS & GYNECOLOGY

## 2023-10-27 RX ORDER — OXYTOCIN/RINGER'S LACTATE 30/500 ML
95 PLASTIC BAG, INJECTION (ML) INTRAVENOUS ONCE AS NEEDED
Status: DISCONTINUED | OUTPATIENT
Start: 2023-10-27 | End: 2023-10-27 | Stop reason: HOSPADM

## 2023-10-27 RX ORDER — OXYTOCIN 10 [USP'U]/ML
10 INJECTION, SOLUTION INTRAMUSCULAR; INTRAVENOUS ONCE AS NEEDED
Status: DISCONTINUED | OUTPATIENT
Start: 2023-10-27 | End: 2023-10-27 | Stop reason: HOSPADM

## 2023-10-27 RX ORDER — CALCIUM CARBONATE 200(500)MG
500 TABLET,CHEWABLE ORAL 3 TIMES DAILY PRN
Status: DISCONTINUED | OUTPATIENT
Start: 2023-10-27 | End: 2023-10-27 | Stop reason: HOSPADM

## 2023-10-27 RX ORDER — LIDOCAINE HYDROCHLORIDE 10 MG/ML
10 INJECTION INFILTRATION; PERINEURAL ONCE AS NEEDED
Status: DISCONTINUED | OUTPATIENT
Start: 2023-10-27 | End: 2023-10-27 | Stop reason: HOSPADM

## 2023-10-27 RX ORDER — OXYTOCIN/RINGER'S LACTATE 30/500 ML
334 PLASTIC BAG, INJECTION (ML) INTRAVENOUS ONCE
Status: DISCONTINUED | OUTPATIENT
Start: 2023-10-27 | End: 2023-10-27 | Stop reason: HOSPADM

## 2023-10-27 RX ORDER — PROCHLORPERAZINE EDISYLATE 5 MG/ML
5 INJECTION INTRAMUSCULAR; INTRAVENOUS EVERY 6 HOURS PRN
Status: DISCONTINUED | OUTPATIENT
Start: 2023-10-27 | End: 2023-10-27 | Stop reason: HOSPADM

## 2023-10-27 RX ORDER — CARBOPROST TROMETHAMINE 250 UG/ML
250 INJECTION, SOLUTION INTRAMUSCULAR
Status: DISCONTINUED | OUTPATIENT
Start: 2023-10-27 | End: 2023-10-27 | Stop reason: HOSPADM

## 2023-10-27 RX ORDER — METHYLERGONOVINE MALEATE 0.2 MG/ML
200 INJECTION INTRAVENOUS ONCE AS NEEDED
Status: DISCONTINUED | OUTPATIENT
Start: 2023-10-27 | End: 2023-10-27 | Stop reason: HOSPADM

## 2023-10-27 RX ORDER — OXYTOCIN/RINGER'S LACTATE 30/500 ML
334 PLASTIC BAG, INJECTION (ML) INTRAVENOUS ONCE AS NEEDED
Status: DISCONTINUED | OUTPATIENT
Start: 2023-10-27 | End: 2023-10-27 | Stop reason: HOSPADM

## 2023-10-27 RX ORDER — SIMETHICONE 80 MG
1 TABLET,CHEWABLE ORAL 4 TIMES DAILY PRN
Status: DISCONTINUED | OUTPATIENT
Start: 2023-10-27 | End: 2023-10-27 | Stop reason: HOSPADM

## 2023-10-27 RX ORDER — SODIUM CHLORIDE, SODIUM LACTATE, POTASSIUM CHLORIDE, CALCIUM CHLORIDE 600; 310; 30; 20 MG/100ML; MG/100ML; MG/100ML; MG/100ML
INJECTION, SOLUTION INTRAVENOUS CONTINUOUS
Status: DISCONTINUED | OUTPATIENT
Start: 2023-10-27 | End: 2023-10-27 | Stop reason: HOSPADM

## 2023-10-27 RX ORDER — MISOPROSTOL 100 UG/1
800 TABLET ORAL ONCE AS NEEDED
Status: DISCONTINUED | OUTPATIENT
Start: 2023-10-27 | End: 2023-10-27 | Stop reason: HOSPADM

## 2023-10-27 RX ORDER — OXYTOCIN/RINGER'S LACTATE 30/500 ML
95 PLASTIC BAG, INJECTION (ML) INTRAVENOUS ONCE
Status: DISCONTINUED | OUTPATIENT
Start: 2023-10-27 | End: 2023-10-27 | Stop reason: HOSPADM

## 2023-10-27 RX ORDER — MEPERIDINE HYDROCHLORIDE 25 MG/ML
25 INJECTION INTRAMUSCULAR; INTRAVENOUS; SUBCUTANEOUS EVERY 4 HOURS PRN
Status: DISCONTINUED | OUTPATIENT
Start: 2023-10-27 | End: 2023-10-27 | Stop reason: HOSPADM

## 2023-10-27 RX ORDER — ONDANSETRON 4 MG/1
8 TABLET, ORALLY DISINTEGRATING ORAL EVERY 8 HOURS PRN
Status: DISCONTINUED | OUTPATIENT
Start: 2023-10-27 | End: 2023-10-27 | Stop reason: HOSPADM

## 2023-10-27 RX ORDER — DIPHENOXYLATE HYDROCHLORIDE AND ATROPINE SULFATE 2.5; .025 MG/1; MG/1
2 TABLET ORAL EVERY 6 HOURS PRN
Status: DISCONTINUED | OUTPATIENT
Start: 2023-10-27 | End: 2023-10-27 | Stop reason: HOSPADM

## 2023-10-27 RX ADMIN — MEPERIDINE HYDROCHLORIDE 25 MG: 25 INJECTION INTRAMUSCULAR; INTRAVENOUS; SUBCUTANEOUS at 05:10

## 2023-10-27 NOTE — NURSING
Discharge instructions given to pt and reviewed in depth. Pt instructed to to report any bright red vaginal bleeding, leaking of fluid, decreased fetal movement or sharp abdominal pain. Adequate time given to answer questions. Pt verbalizes understanding and will follow up with primary OB.  No complications at this time. Pt ambulated off unit to private vehicle.

## 2023-10-27 NOTE — ED PROVIDER NOTES
[facility] NOTE  [facility]     Admit Date: 10/27/2023  [facility] Physician: Hailey Orona  Primary OBGYN: Dr. Jose Leslie    Admit Diagnosis/Chief Complaint: Contractions  Discharge Diagnosis:  latent labor    Chief Complaint   Patient presents with    Contractions       Hospital Course:  Shani Stephens is a 23 y.o.  at 37w0d presents complaining of ctx increasing.  This IUP is complicated by none.  Patient denies vaginal bleeding, leakage of fluid, headache, vision changes, RUQ pain, dysuria, fever, and nausea/vomiting.  Fetal Movement: normal.    /68 (BP Location: Left arm, Patient Position: Sitting)   Pulse 91   Temp 97.9 °F (36.6 °C) (Oral)   Resp 20   LMP 2022   SpO2 100%   Temp:  [97.9 °F (36.6 °C)] 97.9 °F (36.6 °C)  Pulse:  [80-91] 91  Resp:  [20] 20  SpO2:  [100 %] 100 %  BP: (131)/(68) 131/68    General: in no apparent distress oriented times 3 afebrile  Cardiovascular: regular rate and rhythm no murmurs  Respiratory: unlabored  Abdominal: soft, nontender, nondistended, no abnormal masses, no epigastric pain FHT present  Back: lumbar tenderness absent CVA tenderness none suprapubic tenderness absent  Extremeties no redness or tenderness in the calves or thighs edema 1+    SVE (PeriWATCH)  Dilation (cm): 1  Effacement (%): 60  Station: -3  Cervical Consistency: Soft  Examined by:: PHILLIP Jordan RN  Simplified Simpson Score: 3         EFM: Cat 1, 145 modBTV, +accel, no decel (reassuring, reactive)  TOCO:  ctx q3 mins      Medical Decision Making:      LABS:   No results found for this or any previous visit (from the past 24 hour(s)).    Imaging Results    None          ASSESMENT and clinical impression: Shani Stephens is a 23 y.o.   at 37w0d with ctx    - Rule out labor, overall FHR reassuring and reactive, if cervix unchanged, pt to be d/c home, however if any nonreassuring FHR or change in cervix exam, will admit for labor  management        Status:Stable    Disposition:  discharged to home    Medications:   Offered tylenol and PO hydration    Patient Instructions:   - Pt was given routine pregnancy instructions including to return to triage if she had any vaginal bleeding (other than spotting for the next 48hrs), any loss of fluid like her water broke, decreased fetal movement, or contractions Q 5min lasting for 2 or more hours. Pt was also instructed to drink copious water. Patient voiced understanding of all these instructions and was subsequently discharged home. Tylenol use and maternity belt use discussed. All questions answered. Pt left [facility] with good understanding of plan.   Preeclampsia/ROM/labor/fever/decreased FM with C precautions discussed, voiced understanding     She will follow up with her primary OB as scheduled    Hailey Orona MD  OB/GYN Hospitalist  1:40 AM 10/27/2023

## 2023-11-04 ENCOUNTER — HOSPITAL ENCOUNTER (INPATIENT)
Facility: HOSPITAL | Age: 23
LOS: 3 days | Discharge: HOME | End: 2023-11-07
Attending: OBSTETRICS & GYNECOLOGY | Admitting: OBSTETRICS & GYNECOLOGY
Payer: MEDICAID

## 2023-11-04 DIAGNOSIS — Z3A.38 38 WEEKS GESTATION OF PREGNANCY: Primary | ICD-10-CM

## 2023-11-04 DIAGNOSIS — Z34.90 PREGNANT AND NOT YET DELIVERED: ICD-10-CM

## 2023-11-04 PROBLEM — O47.9 FALSE LABOR: Status: RESOLVED | Noted: 2023-10-27 | Resolved: 2023-11-04

## 2023-11-04 PROBLEM — O36.4XX0 IUFD AT 20 WEEKS OR MORE OF GESTATION: Status: ACTIVE | Noted: 2023-11-04

## 2023-11-04 PROCEDURE — 86780 TREPONEMA PALLIDUM: CPT | Performed by: OBSTETRICS & GYNECOLOGY

## 2023-11-04 PROCEDURE — 85025 COMPLETE CBC W/AUTO DIFF WBC: CPT | Performed by: OBSTETRICS & GYNECOLOGY

## 2023-11-04 PROCEDURE — 11000001 HC ACUTE MED/SURG PRIVATE ROOM

## 2023-11-04 PROCEDURE — 99285 EMERGENCY DEPT VISIT HI MDM: CPT

## 2023-11-04 PROCEDURE — 88307 TISSUE EXAM BY PATHOLOGIST: CPT | Performed by: OBSTETRICS & GYNECOLOGY

## 2023-11-04 PROCEDURE — 86900 BLOOD TYPING SEROLOGIC ABO: CPT | Performed by: OBSTETRICS & GYNECOLOGY

## 2023-11-04 RX ORDER — METHYLERGONOVINE MALEATE 0.2 MG/ML
200 INJECTION INTRAVENOUS ONCE AS NEEDED
Status: DISCONTINUED | OUTPATIENT
Start: 2023-11-04 | End: 2023-11-05

## 2023-11-04 RX ORDER — LIDOCAINE HYDROCHLORIDE 10 MG/ML
10 INJECTION INFILTRATION; PERINEURAL ONCE AS NEEDED
Status: DISCONTINUED | OUTPATIENT
Start: 2023-11-04 | End: 2023-11-07 | Stop reason: HOSPADM

## 2023-11-04 RX ORDER — MUPIROCIN 20 MG/G
OINTMENT TOPICAL
Status: DISCONTINUED | OUTPATIENT
Start: 2023-11-04 | End: 2023-11-07 | Stop reason: HOSPADM

## 2023-11-04 RX ORDER — OXYTOCIN/RINGER'S LACTATE 30/500 ML
95 PLASTIC BAG, INJECTION (ML) INTRAVENOUS ONCE AS NEEDED
Status: DISCONTINUED | OUTPATIENT
Start: 2023-11-04 | End: 2023-11-05

## 2023-11-04 RX ORDER — SODIUM CHLORIDE, SODIUM LACTATE, POTASSIUM CHLORIDE, CALCIUM CHLORIDE 600; 310; 30; 20 MG/100ML; MG/100ML; MG/100ML; MG/100ML
INJECTION, SOLUTION INTRAVENOUS CONTINUOUS
Status: DISCONTINUED | OUTPATIENT
Start: 2023-11-04 | End: 2023-11-07 | Stop reason: HOSPADM

## 2023-11-04 RX ORDER — CARBOPROST TROMETHAMINE 250 UG/ML
250 INJECTION, SOLUTION INTRAMUSCULAR
Status: DISCONTINUED | OUTPATIENT
Start: 2023-11-04 | End: 2023-11-05

## 2023-11-04 RX ORDER — OXYTOCIN 10 [USP'U]/ML
10 INJECTION, SOLUTION INTRAMUSCULAR; INTRAVENOUS ONCE AS NEEDED
Status: DISCONTINUED | OUTPATIENT
Start: 2023-11-04 | End: 2023-11-05

## 2023-11-04 RX ORDER — MISOPROSTOL 100 UG/1
800 TABLET ORAL ONCE AS NEEDED
Status: DISCONTINUED | OUTPATIENT
Start: 2023-11-04 | End: 2023-11-05

## 2023-11-04 RX ORDER — SIMETHICONE 80 MG
1 TABLET,CHEWABLE ORAL 4 TIMES DAILY PRN
Status: DISCONTINUED | OUTPATIENT
Start: 2023-11-04 | End: 2023-11-05

## 2023-11-04 RX ORDER — DIPHENOXYLATE HYDROCHLORIDE AND ATROPINE SULFATE 2.5; .025 MG/1; MG/1
2 TABLET ORAL EVERY 6 HOURS PRN
Status: DISCONTINUED | OUTPATIENT
Start: 2023-11-04 | End: 2023-11-05

## 2023-11-04 RX ORDER — CALCIUM CARBONATE 200(500)MG
500 TABLET,CHEWABLE ORAL 3 TIMES DAILY PRN
Status: DISCONTINUED | OUTPATIENT
Start: 2023-11-04 | End: 2023-11-07 | Stop reason: HOSPADM

## 2023-11-04 RX ORDER — OXYTOCIN/RINGER'S LACTATE 30/500 ML
95 PLASTIC BAG, INJECTION (ML) INTRAVENOUS ONCE
Status: DISCONTINUED | OUTPATIENT
Start: 2023-11-04 | End: 2023-11-07 | Stop reason: HOSPADM

## 2023-11-04 RX ORDER — OXYTOCIN/RINGER'S LACTATE 30/500 ML
0-30 PLASTIC BAG, INJECTION (ML) INTRAVENOUS CONTINUOUS
Status: DISCONTINUED | OUTPATIENT
Start: 2023-11-04 | End: 2023-11-07 | Stop reason: HOSPADM

## 2023-11-04 RX ORDER — PROCHLORPERAZINE EDISYLATE 5 MG/ML
5 INJECTION INTRAMUSCULAR; INTRAVENOUS EVERY 6 HOURS PRN
Status: DISCONTINUED | OUTPATIENT
Start: 2023-11-04 | End: 2023-11-05

## 2023-11-04 RX ORDER — OXYTOCIN/RINGER'S LACTATE 30/500 ML
334 PLASTIC BAG, INJECTION (ML) INTRAVENOUS ONCE AS NEEDED
Status: DISCONTINUED | OUTPATIENT
Start: 2023-11-04 | End: 2023-11-05

## 2023-11-04 RX ORDER — OXYTOCIN/RINGER'S LACTATE 30/500 ML
334 PLASTIC BAG, INJECTION (ML) INTRAVENOUS ONCE
Status: DISCONTINUED | OUTPATIENT
Start: 2023-11-04 | End: 2023-11-05

## 2023-11-04 RX ORDER — ONDANSETRON 4 MG/1
8 TABLET, ORALLY DISINTEGRATING ORAL EVERY 8 HOURS PRN
Status: DISCONTINUED | OUTPATIENT
Start: 2023-11-04 | End: 2023-11-05

## 2023-11-05 ENCOUNTER — ANESTHESIA EVENT (OUTPATIENT)
Dept: OBSTETRICS AND GYNECOLOGY | Facility: HOSPITAL | Age: 23
End: 2023-11-05
Payer: MEDICAID

## 2023-11-05 ENCOUNTER — ANESTHESIA (OUTPATIENT)
Dept: OBSTETRICS AND GYNECOLOGY | Facility: HOSPITAL | Age: 23
End: 2023-11-05
Payer: MEDICAID

## 2023-11-05 VITALS — RESPIRATION RATE: 16 BRPM

## 2023-11-05 LAB
BASOPHILS # BLD AUTO: 0.02 X10(3)/MCL
BASOPHILS NFR BLD AUTO: 0.2 %
EOSINOPHIL # BLD AUTO: 0.05 X10(3)/MCL (ref 0–0.9)
EOSINOPHIL NFR BLD AUTO: 0.5 %
ERYTHROCYTE [DISTWIDTH] IN BLOOD BY AUTOMATED COUNT: 14.4 % (ref 11.5–17)
GROUP & RH: NORMAL
HCT VFR BLD AUTO: 34.5 % (ref 37–47)
HGB BLD-MCNC: 11.3 G/DL (ref 12–16)
IMM GRANULOCYTES # BLD AUTO: 0.05 X10(3)/MCL (ref 0–0.04)
IMM GRANULOCYTES NFR BLD AUTO: 0.5 %
INDIRECT COOMBS GEL: NORMAL
LYMPHOCYTES # BLD AUTO: 1.76 X10(3)/MCL (ref 0.6–4.6)
LYMPHOCYTES NFR BLD AUTO: 17 %
MCH RBC QN AUTO: 26.2 PG (ref 27–31)
MCHC RBC AUTO-ENTMCNC: 32.8 G/DL (ref 33–36)
MCV RBC AUTO: 79.9 FL (ref 80–94)
MONOCYTES # BLD AUTO: 0.96 X10(3)/MCL (ref 0.1–1.3)
MONOCYTES NFR BLD AUTO: 9.3 %
NEUTROPHILS # BLD AUTO: 7.53 X10(3)/MCL (ref 2.1–9.2)
NEUTROPHILS NFR BLD AUTO: 72.5 %
NRBC BLD AUTO-RTO: 0 %
PLATELET # BLD AUTO: 217 X10(3)/MCL (ref 130–400)
PLATELETS.RETICULATED NFR BLD AUTO: 9.8 % (ref 0.9–11.2)
PMV BLD AUTO: 11.7 FL (ref 7.4–10.4)
RBC # BLD AUTO: 4.32 X10(6)/MCL (ref 4.2–5.4)
SPECIMEN OUTDATE: NORMAL
T PALLIDUM AB SER QL: NONREACTIVE
WBC # SPEC AUTO: 10.37 X10(3)/MCL (ref 4.5–11.5)

## 2023-11-05 PROCEDURE — 25000003 PHARM REV CODE 250: Performed by: OBSTETRICS & GYNECOLOGY

## 2023-11-05 PROCEDURE — 63600175 PHARM REV CODE 636 W HCPCS: Performed by: OBSTETRICS & GYNECOLOGY

## 2023-11-05 PROCEDURE — 72100002 HC LABOR CARE, 1ST 8 HOURS

## 2023-11-05 PROCEDURE — 59409 OBSTETRICAL CARE: CPT | Mod: AA,,, | Performed by: ANESTHESIOLOGY

## 2023-11-05 PROCEDURE — 63600175 PHARM REV CODE 636 W HCPCS: Performed by: ANESTHESIOLOGY

## 2023-11-05 PROCEDURE — 72100003 HC LABOR CARE, EA. ADDL. 8 HRS

## 2023-11-05 PROCEDURE — 62326 NJX INTERLAMINAR LMBR/SAC: CPT | Performed by: ANESTHESIOLOGY

## 2023-11-05 PROCEDURE — 11000001 HC ACUTE MED/SURG PRIVATE ROOM

## 2023-11-05 PROCEDURE — 59409 PRA ETRICAL CARE,VAG DELIV ONLY: ICD-10-PCS | Mod: AA,,, | Performed by: ANESTHESIOLOGY

## 2023-11-05 PROCEDURE — 25000003 PHARM REV CODE 250: Performed by: ANESTHESIOLOGY

## 2023-11-05 PROCEDURE — 51702 INSERT TEMP BLADDER CATH: CPT

## 2023-11-05 RX ORDER — SIMETHICONE 80 MG
1 TABLET,CHEWABLE ORAL EVERY 6 HOURS PRN
Status: DISCONTINUED | OUTPATIENT
Start: 2023-11-05 | End: 2023-11-07 | Stop reason: HOSPADM

## 2023-11-05 RX ORDER — PRENATAL WITH FERROUS FUM AND FOLIC ACID 3080; 920; 120; 400; 22; 1.84; 3; 20; 10; 1; 12; 200; 27; 25; 2 [IU]/1; [IU]/1; MG/1; [IU]/1; MG/1; MG/1; MG/1; MG/1; MG/1; MG/1; UG/1; MG/1; MG/1; MG/1; MG/1
1 TABLET ORAL DAILY
Status: DISCONTINUED | OUTPATIENT
Start: 2023-11-06 | End: 2023-11-07 | Stop reason: HOSPADM

## 2023-11-05 RX ORDER — DIPHENHYDRAMINE HYDROCHLORIDE 50 MG/ML
25 INJECTION INTRAMUSCULAR; INTRAVENOUS EVERY 4 HOURS PRN
Status: DISCONTINUED | OUTPATIENT
Start: 2023-11-05 | End: 2023-11-07 | Stop reason: HOSPADM

## 2023-11-05 RX ORDER — BUPIVACAINE HYDROCHLORIDE 2.5 MG/ML
INJECTION, SOLUTION EPIDURAL; INFILTRATION; INTRACAUDAL
Status: COMPLETED | OUTPATIENT
Start: 2023-11-05 | End: 2023-11-05

## 2023-11-05 RX ORDER — MEPERIDINE HYDROCHLORIDE 25 MG/ML
25 INJECTION INTRAMUSCULAR; INTRAVENOUS; SUBCUTANEOUS
Status: DISPENSED | OUTPATIENT
Start: 2023-11-05 | End: 2023-11-06

## 2023-11-05 RX ORDER — OXYTOCIN 10 [USP'U]/ML
10 INJECTION, SOLUTION INTRAMUSCULAR; INTRAVENOUS ONCE AS NEEDED
Status: DISCONTINUED | OUTPATIENT
Start: 2023-11-05 | End: 2023-11-07 | Stop reason: HOSPADM

## 2023-11-05 RX ORDER — ONDANSETRON 4 MG/1
8 TABLET, ORALLY DISINTEGRATING ORAL EVERY 8 HOURS PRN
Status: DISCONTINUED | OUTPATIENT
Start: 2023-11-05 | End: 2023-11-07 | Stop reason: HOSPADM

## 2023-11-05 RX ORDER — OXYCODONE HYDROCHLORIDE 5 MG/1
10 TABLET ORAL EVERY 6 HOURS PRN
Status: DISCONTINUED | OUTPATIENT
Start: 2023-11-05 | End: 2023-11-07 | Stop reason: HOSPADM

## 2023-11-05 RX ORDER — FENTANYL/BUPIVACAINE/NS/PF 2-1250MCG
PLASTIC BAG, INJECTION (ML) INJECTION CONTINUOUS PRN
Status: DISCONTINUED | OUTPATIENT
Start: 2023-11-05 | End: 2023-11-05

## 2023-11-05 RX ORDER — HYDROCORTISONE 25 MG/G
CREAM TOPICAL 3 TIMES DAILY PRN
Status: DISCONTINUED | OUTPATIENT
Start: 2023-11-05 | End: 2023-11-07 | Stop reason: HOSPADM

## 2023-11-05 RX ORDER — DIPHENHYDRAMINE HCL 25 MG
25 CAPSULE ORAL EVERY 4 HOURS PRN
Status: DISCONTINUED | OUTPATIENT
Start: 2023-11-05 | End: 2023-11-07 | Stop reason: HOSPADM

## 2023-11-05 RX ORDER — DOCUSATE SODIUM 100 MG/1
200 CAPSULE, LIQUID FILLED ORAL 2 TIMES DAILY PRN
Status: DISCONTINUED | OUTPATIENT
Start: 2023-11-05 | End: 2023-11-07 | Stop reason: HOSPADM

## 2023-11-05 RX ORDER — LIDOCAINE HYDROCHLORIDE 10 MG/ML
1 INJECTION, SOLUTION EPIDURAL; INFILTRATION; INTRACAUDAL; PERINEURAL ONCE
Status: DISCONTINUED | OUTPATIENT
Start: 2023-11-05 | End: 2023-11-07 | Stop reason: HOSPADM

## 2023-11-05 RX ORDER — ACETAMINOPHEN 325 MG/1
650 TABLET ORAL EVERY 6 HOURS PRN
Status: DISCONTINUED | OUTPATIENT
Start: 2023-11-05 | End: 2023-11-07 | Stop reason: HOSPADM

## 2023-11-05 RX ORDER — IBUPROFEN 600 MG/1
600 TABLET ORAL EVERY 6 HOURS
Status: DISCONTINUED | OUTPATIENT
Start: 2023-11-06 | End: 2023-11-07 | Stop reason: HOSPADM

## 2023-11-05 RX ORDER — CARBOPROST TROMETHAMINE 250 UG/ML
250 INJECTION, SOLUTION INTRAMUSCULAR
Status: DISCONTINUED | OUTPATIENT
Start: 2023-11-05 | End: 2023-11-07 | Stop reason: HOSPADM

## 2023-11-05 RX ORDER — OXYTOCIN/RINGER'S LACTATE 30/500 ML
95 PLASTIC BAG, INJECTION (ML) INTRAVENOUS ONCE AS NEEDED
Status: DISCONTINUED | OUTPATIENT
Start: 2023-11-05 | End: 2023-11-07 | Stop reason: HOSPADM

## 2023-11-05 RX ORDER — OXYCODONE AND ACETAMINOPHEN 5; 325 MG/1; MG/1
1 TABLET ORAL EVERY 4 HOURS PRN
Status: DISCONTINUED | OUTPATIENT
Start: 2023-11-05 | End: 2023-11-07 | Stop reason: HOSPADM

## 2023-11-05 RX ORDER — PROMETHAZINE HYDROCHLORIDE 25 MG/ML
25 INJECTION, SOLUTION INTRAMUSCULAR; INTRAVENOUS EVERY 6 HOURS PRN
Status: DISCONTINUED | OUTPATIENT
Start: 2023-11-05 | End: 2023-11-07 | Stop reason: HOSPADM

## 2023-11-05 RX ORDER — MISOPROSTOL 100 UG/1
800 TABLET ORAL ONCE AS NEEDED
Status: DISCONTINUED | OUTPATIENT
Start: 2023-11-05 | End: 2023-11-07 | Stop reason: HOSPADM

## 2023-11-05 RX ORDER — PROCHLORPERAZINE EDISYLATE 5 MG/ML
5 INJECTION INTRAMUSCULAR; INTRAVENOUS EVERY 6 HOURS PRN
Status: DISCONTINUED | OUTPATIENT
Start: 2023-11-05 | End: 2023-11-07 | Stop reason: HOSPADM

## 2023-11-05 RX ORDER — METHYLERGONOVINE MALEATE 0.2 MG/ML
200 INJECTION INTRAVENOUS ONCE AS NEEDED
Status: DISCONTINUED | OUTPATIENT
Start: 2023-11-05 | End: 2023-11-07 | Stop reason: HOSPADM

## 2023-11-05 RX ORDER — SODIUM CHLORIDE 0.9 % (FLUSH) 0.9 %
10 SYRINGE (ML) INJECTION
Status: DISCONTINUED | OUTPATIENT
Start: 2023-11-05 | End: 2023-11-07 | Stop reason: HOSPADM

## 2023-11-05 RX ORDER — OXYTOCIN/RINGER'S LACTATE 30/500 ML
334 PLASTIC BAG, INJECTION (ML) INTRAVENOUS ONCE AS NEEDED
Status: DISCONTINUED | OUTPATIENT
Start: 2023-11-05 | End: 2023-11-07 | Stop reason: HOSPADM

## 2023-11-05 RX ORDER — OXYTOCIN/RINGER'S LACTATE 30/500 ML
95 PLASTIC BAG, INJECTION (ML) INTRAVENOUS ONCE
Status: DISCONTINUED | OUTPATIENT
Start: 2023-11-05 | End: 2023-11-07 | Stop reason: HOSPADM

## 2023-11-05 RX ORDER — NALOXONE HCL 0.4 MG/ML
0.4 VIAL (ML) INJECTION SEE ADMIN INSTRUCTIONS
Status: DISCONTINUED | OUTPATIENT
Start: 2023-11-05 | End: 2023-11-07 | Stop reason: HOSPADM

## 2023-11-05 RX ORDER — EPHEDRINE SULFATE 50 MG/ML
10 INJECTION, SOLUTION INTRAVENOUS ONCE AS NEEDED
Status: DISCONTINUED | OUTPATIENT
Start: 2023-11-05 | End: 2023-11-07 | Stop reason: HOSPADM

## 2023-11-05 RX ORDER — DIPHENOXYLATE HYDROCHLORIDE AND ATROPINE SULFATE 2.5; .025 MG/1; MG/1
2 TABLET ORAL EVERY 6 HOURS PRN
Status: DISCONTINUED | OUTPATIENT
Start: 2023-11-05 | End: 2023-11-07 | Stop reason: HOSPADM

## 2023-11-05 RX ORDER — SODIUM CITRATE AND CITRIC ACID MONOHYDRATE 334; 500 MG/5ML; MG/5ML
30 SOLUTION ORAL ONCE
Status: COMPLETED | OUTPATIENT
Start: 2023-11-05 | End: 2023-11-05

## 2023-11-05 RX ADMIN — PROMETHAZINE HYDROCHLORIDE 25 MG: 25 INJECTION INTRAMUSCULAR; INTRAVENOUS at 02:11

## 2023-11-05 RX ADMIN — MEPERIDINE HYDROCHLORIDE 25 MG: 25 INJECTION INTRAMUSCULAR; INTRAVENOUS; SUBCUTANEOUS at 02:11

## 2023-11-05 RX ADMIN — SODIUM CHLORIDE, POTASSIUM CHLORIDE, SODIUM LACTATE AND CALCIUM CHLORIDE: 600; 310; 30; 20 INJECTION, SOLUTION INTRAVENOUS at 03:11

## 2023-11-05 RX ADMIN — Medication 2 MILLI-UNITS/MIN: at 11:11

## 2023-11-05 RX ADMIN — Medication 12 ML/HR: at 03:11

## 2023-11-05 RX ADMIN — DINOPROSTONE 10 MG: 10 INSERT VAGINAL at 12:11

## 2023-11-05 RX ADMIN — ONDANSETRON 8 MG: 4 TABLET, ORALLY DISINTEGRATING ORAL at 08:11

## 2023-11-05 RX ADMIN — SODIUM CITRATE AND CITRIC ACID MONOHYDRATE 30 ML: 500; 334 SOLUTION ORAL at 02:11

## 2023-11-05 RX ADMIN — BUPIVACAINE HYDROCHLORIDE 7 ML: 2.5 INJECTION, SOLUTION EPIDURAL; INFILTRATION; INTRACAUDAL; PERINEURAL at 03:11

## 2023-11-05 RX ADMIN — SODIUM CHLORIDE, POTASSIUM CHLORIDE, SODIUM LACTATE AND CALCIUM CHLORIDE 1000 ML: 600; 310; 30; 20 INJECTION, SOLUTION INTRAVENOUS at 02:11

## 2023-11-05 RX ADMIN — MEPERIDINE HYDROCHLORIDE 25 MG: 25 INJECTION INTRAMUSCULAR; INTRAVENOUS; SUBCUTANEOUS at 08:11

## 2023-11-05 NOTE — HPI
23 yr  @ 38 wk 1 d presenting for decreased FM.  Pt believes she last felt baby move yesterday.  Pt also states she has home FHT doppler and heard FHT in 140's.  Pt states no prenatal complications.  Pt states no health probs outside of pregnancy.

## 2023-11-05 NOTE — ANESTHESIA PROCEDURE NOTES
Epidural    Patient location during procedure: OB   Reason for block: primary anesthetic   Reason for block: labor analgesia requested by patient and obstetrician  Diagnosis: IUP in labor   Start time: 11/5/2023 2:59 PM  Timeout: 11/5/2023 2:57 PM  End time: 11/5/2023 3:06 PM    Staffing  Performing Provider: Froilan Conteh Jr., MD  Authorizing Provider: Froilan Conteh Jr., MD    Staffing  Performed by: Froilan Conteh Jr., MD  Authorized by: Froilan Conteh Jr., MD        Preanesthetic Checklist  Completed: patient identified, IV checked, site marked, risks and benefits discussed, surgical consent, monitors and equipment checked, pre-op evaluation, timeout performed, anesthesia consent given, hand hygiene performed and patient being monitored  Preparation  Patient position: sitting  Prep: ChloraPrep  Patient monitoring: Pulse Ox and Blood Pressure  Reason for block: primary anesthetic   Epidural  Skin Anesthetic: lidocaine 1%  Skin Wheal: 4 mL  Administration type: continuous  Approach: midline  Interspace: L4-5    Injection technique: PERLITA air and PERLITA saline  Needle and Epidural Catheter  Needle type: Tuohy   Needle gauge: 17  Needle length: 3.5 inches  Catheter type: Vanksen  Catheter size: 19 G  Insertion Attempts: 1  Test dose: 3 mL of lidocaine 1.5% with Epi 1-to-200,000  Additional Documentation: incremental injection, no paresthesia on injection, no significant pain on injection, negative aspiration for heme and CSF, no signs/symptoms of IV or SA injection and no significant complaints from patient  Needle localization: anatomical landmarks  Assessment  Upper dermatomal levels - Left: T8  Right: T8   Dermatomal levels determined by ice  Ease of block: easy  Patient's tolerance of the procedure: comfortable throughout block and no complaints  Additional Notes  Postdural puncture w/ 5in 25g Don    Initial Epidrual bolus diluted w/ 7cc PFNS No inadvertent dural puncture with Tuohy.  Dural puncture  performed with spinal needle.      Medications:    Medications: bupivacaine (pf) (MARCAINE) injection 0.25% - Epidural   7 mL - 11/5/2023 3:07:00 PM

## 2023-11-05 NOTE — H&P
[facility]                       [address] [place] [address]    PATIENT NAME:       JULIO C STEPHENS   YOB: 2000  CSN:                [id number]   MRN:                [id number]  ADMIT DATE:         2023 22:10:00  PHYSICIAN:          Patricio Leslie MD                        HISTORY AND PHYSICAL      HISTORY OF PRESENT ILLNESS:  Ms. Stephens is a 23-year-old,  2, para 1,   at 38 weeks and 2 days who presents to [facility] with decreased fetal   movement now for 24 hours.  She last thinks she felt the baby move yesterday.    Unfortunately, the baby has passed.  She will be admitted for induction of labor   for intrauterine fetal demise.    MEDICAL HISTORY:  The patient denies any medical history.    SURGICAL HISTORY:  The patient denies any surgical history.    OBSTETRICAL HISTORY:  One previous full-term vaginal delivery.    GYNECOLOGICAL HISTORY:  She denies.    SOCIAL HISTORY:  Denies.    FAMILY HISTORY:  Denies.    REVIEW OF SYSTEMS:  She denies headaches, visual changes, chest pain, shortness of breath, fevers,   chills, sick contacts.  She reports decreased fetal movement over 24 hours.    Denies abdominal trauma.  Denies bleeding.  Denies diarrhea.    PHYSICAL EXAMINATION:  VITAL SIGNS:  Reviewed.  Blood pressure 140s-150s/80s, pulse is in the 90s,   respirations 16-18.   GENERAL:  She is alert and oriented x3.  Obviously sad.    ABDOMEN:  Gravid.  PELVIC:  She was 2.5 cm.    EXTREMITIES:  Nontender.    ASSESSMENT AND PLAN:  This is a 23-year-old multigravida female, intrauterine   fetal demise at 38 weeks 2 days.  Ultrasound shows no fetal heart tones   identified.  The plan is to admit to [facility] for induction of labor,   pastoral and social support, pain management, IV fluids.        ______________________________  MD MAKAYLA ReyesE/CAMMYS  DD:  2023  Time:   03:17AM  DT:  11/05/2023  Time:  04:55AM  Job #:  [id number]      HISTORY AND PHYSICAL

## 2023-11-05 NOTE — ANESTHESIA PREPROCEDURE EVALUATION
"                                                                                                             11/05/2023  Shani Stephens is a 23 y.o., female IUP s/p demise w/ 8/10 pain and 3+cm      Last 3 sets of Vitals        10/27/2023     2:19 AM 11/4/2023    10:21 PM 11/5/2023     3:08 PM   Vitals - 1 value per visit   SYSTOLIC  159    DIASTOLIC  82    Pulse  108    Temp  36.7 °C (98 °F)    Resp  18 16   Weight (lb) 165     Weight (kg) 74.844     Height 5' 4" (1.626 m)     BMI (Calculated) 28.3           Lab Results   Component Value Date    WBC 10.37 11/04/2023    HGB 11.3 (L) 11/04/2023    HCT 34.5 (L) 11/04/2023    MCV 79.9 (L) 11/04/2023     11/04/2023          BMP  Lab Results   Component Value Date     06/12/2023    K 3.6 06/12/2023    CO2 20 (L) 06/12/2023    BUN 9.7 06/12/2023    CREATININE 0.71 06/12/2023    CALCIUM 9.5 06/12/2023    EGFRNONAA >60 09/14/2019    Pre-op Assessment    I have reviewed the Patient Summary Reports.     I have reviewed the Nursing Notes. I have reviewed the NPO Status.   I have reviewed the Medications.     Review of Systems  Anesthesia Hx:  No problems with previous Anesthesia  History of prior surgery of interest to airway management or planning:   Hematology/Oncology:         -- Anemia:   Cardiovascular:  Cardiovascular Normal   Functional Capacity good / => 4 METS    Pulmonary:  Pulmonary Normal    OB/GYN/PEDS:  Planned Vaginal Delivery    Neurological:  Neurology Normal        Physical Exam  General: Well nourished, Cooperative, Alert and Oriented    Airway:  Mallampati: II   Mouth Opening: Normal  TM Distance: Normal  Tongue: Normal  Neck ROM: Normal ROM    Dental:  Intact    Chest/Lungs:  Clear to auscultation, Normal Respiratory Rate    Heart:  Rate: Normal  Rhythm: Regular Rhythm        Anesthesia Plan  Type of Anesthesia, risks & benefits discussed:    Anesthesia Type: Epidural  Post Op Pain Control Plan: epidural analgesia  Informed Consent: " Informed consent signed with the Patient and all parties understand the risks and agree with anesthesia plan.  All questions answered.   ASA Score: 2  Day of Surgery Review of History & Physical: H&P Update referred to the surgeon/provider.    Ready For Surgery From Anesthesia Perspective.     .

## 2023-11-05 NOTE — SUBJECTIVE & OBJECTIVE
Obstetric HPI:  Patient reports None contractions, decreased  fetal movement, No vaginal bleeding , No loss of fluid     This pregnancy has been complicated by no significant complications    OB History    Para Term  AB Living   2 1 1 0 0 1   SAB IAB Ectopic Multiple Live Births   0 0 0 0 1      # Outcome Date GA Lbr Eleazar/2nd Weight Sex Delivery Anes PTL Lv   2 Current            1 Term 17     Vag-Spont   BILL     No past medical history on file.  No past surgical history on file.    (Not in a hospital admission)      Review of patient's allergies indicates:  No Known Allergies     Family History       Problem Relation (Age of Onset)    No Known Problems Mother, Father          Tobacco Use    Smoking status: Never    Smokeless tobacco: Never   Substance and Sexual Activity    Alcohol use: Never    Drug use: Never    Sexual activity: Yes     Review of Systems   Constitutional: Negative.    Respiratory: Negative.     Cardiovascular: Negative.    Gastrointestinal: Negative.    Endocrine: Negative.    Genitourinary: Negative.    Musculoskeletal: Negative.    Neurological: Negative.    Hematological: Negative.    Psychiatric/Behavioral: Negative.     Breast: negative.       Objective:     Vital Signs (Most Recent):  Temp: 98 °F (36.7 °C) (23)  Pulse: 108 (23)  Resp: 18 (23)  BP: (!) 159/82 (23) Vital Signs (24h Range):  Temp:  [98 °F (36.7 °C)] 98 °F (36.7 °C)  Pulse:  [108] 108  Resp:  [18] 18  BP: (159)/(82) 159/82        There is no height or weight on file to calculate BMI.    FHT: 0, demise confirmed  TOCO:  No seen     Physical Exam:   Constitutional: She is oriented to person, place, and time. She appears well-developed and well-nourished. No distress.    HENT:   Head: Normocephalic and atraumatic.     Neck: No thyromegaly present.     Pulmonary/Chest: Effort normal. No respiratory distress.        Abdominal: Soft. She exhibits no distension and no  mass. There is no abdominal tenderness. There is no rebound and no guarding.             Musculoskeletal: Normal range of motion and moves all extremeties. No tenderness.       Neurological: She is alert and oriented to person, place, and time. No cranial nerve deficit. Coordination normal.    Skin: Skin is warm. She is not diaphoretic.    Psychiatric: She has a normal mood and affect. Her behavior is normal. Judgment and thought content normal.          Significant Labs:  Lab Results   Component Value Date    [facility] 10/27/[facility]    STREPBCULT positive 10/25/2023    AFP 31.1 05/31/2023       I have personallly reviewed all pertinent lab results from the last 24 hours.

## 2023-11-05 NOTE — NURSING
Discussed with patient plans for after birth. Patient states she wants baby to be cleaned up before seeing. Told patient about our plans to do foot prints and keep sakes for her to view that will be kept in box to view when ready. Patient stated she did not want baby to stay in room with her after viewing, but was told she will be able to view baby throughout her stay here in hospital. Patient requested baby to be blessed. Patient and family informed that they had to choose a  home and contact them. Patient consented for us to take pictures of baby for her to keep in her memory box. Patient was asked multiple times throughout the day if she had any questions, pt denied having questions.

## 2023-11-05 NOTE — HOSPITAL COURSE
IUFD confirmed via limited OB sono done at bedside upon presentation.  Dr. IESHA Leslie notified to assume care.

## 2023-11-05 NOTE — H&P
[facility] - Emergency Dept (OB)  Obstetrics  History & Physical    Patient Name: Shani Stephens  MRN: [id number]  Admission Date: 2023  Primary Care Provider: Anne Wilson DO    Subjective:     Principal Problem:IUFD at 20 weeks or more of gestation    History of Present Illness:  23 yr  @ 38 wk 1 d presenting for decreased FM.  Pt believes she last felt baby move yesterday.  Pt also states she has home FHT doppler and heard FHT in 140's.  Pt states no prenatal complications.  Pt states no health probs outside of pregnancy.      Obstetric HPI:  Patient reports None contractions, decreased  fetal movement, No vaginal bleeding , No loss of fluid     This pregnancy has been complicated by no significant complications    OB History    Para Term  AB Living   2 1 1 0 0 1   SAB IAB Ectopic Multiple Live Births   0 0 0 0 1      # Outcome Date GA Lbr Eleazar/2nd Weight Sex Delivery Anes PTL Lv   2 Current            1 Term 17     Vag-Spont   BILL     No past medical history on file.  No past surgical history on file.    (Not in a hospital admission)      Review of patient's allergies indicates:  No Known Allergies     Family History       Problem Relation (Age of Onset)    No Known Problems Mother, Father          Tobacco Use    Smoking status: Never    Smokeless tobacco: Never   Substance and Sexual Activity    Alcohol use: Never    Drug use: Never    Sexual activity: Yes     Review of Systems   Constitutional: Negative.    Respiratory: Negative.     Cardiovascular: Negative.    Gastrointestinal: Negative.    Endocrine: Negative.    Genitourinary: Negative.    Musculoskeletal: Negative.    Neurological: Negative.    Hematological: Negative.    Psychiatric/Behavioral: Negative.     Breast: negative.       Objective:     Vital Signs (Most Recent):  Temp: 98 °F (36.7 °C) (23)  Pulse: 108 (23)  Resp: 18 (23)  BP: (!) 159/82 (23) Vital  Signs (24h Range):  Temp:  [98 °F (36.7 °C)] 98 °F (36.7 °C)  Pulse:  [108] 108  Resp:  [18] 18  BP: (159)/(82) 159/82        There is no height or weight on file to calculate BMI.    FHT: 0, demise confirmed  TOCO:  No seen     Physical Exam:   Constitutional: She is oriented to person, place, and time. She appears well-developed and well-nourished. No distress.    HENT:   Head: Normocephalic and atraumatic.     Neck: No thyromegaly present.     Pulmonary/Chest: Effort normal. No respiratory distress.        Abdominal: Soft. She exhibits no distension and no mass. There is no abdominal tenderness. There is no rebound and no guarding.             Musculoskeletal: Normal range of motion and moves all extremeties. No tenderness.       Neurological: She is alert and oriented to person, place, and time. No cranial nerve deficit. Coordination normal.    Skin: Skin is warm. She is not diaphoretic.    Psychiatric: She has a normal mood and affect. Her behavior is normal. Judgment and thought content normal.          Significant Labs:  Lab Results   Component Value Date    [facility]TR B POS 10/27/2023    STREPBCULT positive 10/25/2023    AFP 31.1 2023       I have personallly reviewed all pertinent lab results from the last 24 hours.    Assessment/Plan:     23 y.o. female  at 38w1d for:    * IUFD at 20 weeks or more of gestation  Pt with prior ; pt's labor will be induced when pt ready; Dr. KAYLA Leslie notified; labs for demise eval ordered    38 weeks gestation of pregnancy  Pt with prior ; pt's labor will be induced when pt ready; Dr. KAYLA Leslie notified; labs for demise eval ordered          Cristian Ahumada MD  Obstetrics  [facility] - Emergency Dept (OB)

## 2023-11-06 PROCEDURE — 25000003 PHARM REV CODE 250: Performed by: OBSTETRICS & GYNECOLOGY

## 2023-11-06 PROCEDURE — 72200005 HC VAGINAL DELIVERY LEVEL II

## 2023-11-06 PROCEDURE — 11000001 HC ACUTE MED/SURG PRIVATE ROOM

## 2023-11-06 RX ORDER — OXYCODONE AND ACETAMINOPHEN 5; 325 MG/1; MG/1
1 TABLET ORAL EVERY 6 HOURS PRN
Qty: 20 TABLET | Refills: 0 | Status: SHIPPED | OUTPATIENT
Start: 2023-11-06 | End: 2024-03-25

## 2023-11-06 RX ADMIN — IBUPROFEN 600 MG: 600 TABLET, FILM COATED ORAL at 05:11

## 2023-11-06 RX ADMIN — IBUPROFEN 600 MG: 600 TABLET, FILM COATED ORAL at 02:11

## 2023-11-06 RX ADMIN — OXYCODONE HYDROCHLORIDE AND ACETAMINOPHEN 1 TABLET: 5; 325 TABLET ORAL at 09:11

## 2023-11-06 RX ADMIN — IBUPROFEN 600 MG: 600 TABLET, FILM COATED ORAL at 09:11

## 2023-11-06 RX ADMIN — OXYCODONE HYDROCHLORIDE AND ACETAMINOPHEN 1 TABLET: 5; 325 TABLET ORAL at 07:11

## 2023-11-06 RX ADMIN — IBUPROFEN 600 MG: 600 TABLET, FILM COATED ORAL at 11:11

## 2023-11-06 RX ADMIN — OXYCODONE HYDROCHLORIDE 10 MG: 5 TABLET ORAL at 01:11

## 2023-11-06 RX ADMIN — PRENATAL VITAMINS-IRON FUMARATE 27 MG IRON-FOLIC ACID 0.8 MG TABLET 1 TABLET: at 09:11

## 2023-11-06 NOTE — NURSING
Pt ambulated to the bathroom with assistance of the nurse, pt sill due to void. Caro care preformed. Pads applied. Pt wheeled to [facility].

## 2023-11-06 NOTE — CONSULTS
CM consulted for bereavement assessment after the birth of 38 1/7 week daughter, Josef San. Mom present in postpartum room alone for visit and Mom was agreeable to meet with CM at this time. Expressed sincere condolences to Mom. Explained the services [employer] offers to Mom and she was agreeable to referral. [employer] application completed. Mom reported choosing [facility] for cremation services. Explained to Mom that she would need to call [facility] to make arrangements and to let RN know when that call has been made. Mom explained that her aunt would be making arrangements and that she would let RN know when  home has been contacted. Mom reported having a strong support system to lean on including FOB, her nanny and her aunt. Mom reported having a 6 year old daughter at home and children's resources were provided. FOB and Mom's nanny arrived at the end of consult. Contact information left with Mom for any further social or resource needs. Mom reported experiencing some pain and CM gave report to JOÃO Keller.

## 2023-11-06 NOTE — OP NOTE
[facility]                       [address]                      [place] [address]    PATIENT NAME:      JULIO C HENRY   YOB: 2000  CSN:               [id number]  MRN:               [id number]  ADMIT DATE:        2023 22:10:00  PHYSICIAN:         Patricio Leslie MD                          OPERATIVE REPORT      DATE OF SURGERY:    2023 00:00:00    SURGEON:  Patricio Leslie MD    DELIVERY NOTE:  Ms. Henry is a 23-year-old,  2, para 1-0-0-1, who   presented to [facility] at 38 weeks and 1 day with intrauterine fetal   demise.  She was induced and just delivered a nonviable female infant, 7 pounds,   even.  Of note, the baby had a tight nuchal cord x1.  Otherwise,   normal-appearing child, seen atypically, and no gross abnormalities were noted.    Had tight nuchal cord and appeared there was a large clot in the cord.  She   delivered the baby in Ariana position.  Baby delivered, LONNIE, followed by the   anterior shoulder and Ariana.  The rest of the baby was delivered.  Cord   doubly clamped and cut.  Baby passed off to the nursing team.  The placenta   delivered spontaneously intact.  She had a second-degree laceration repaired   with 2-0 chromic suture in a running fashion.  Hemostasis noted.  Uterus is   firm.  Placenta is intact and will be sent for pathology.  Sponge, lap,   instrument, and needle counts correct x2.  The patient will be cleaned and sent   to the postpartum floor in stable condition.  We are also consulting a    for pastoral care on these unfortunate circumstances.        ______________________________  Patricio Leslie MD    EPE/AQS  DD:  2023  Time:  08:01PM  DT:  2023  Time:  08:37PM  Job #:  [id number]      OPERATIVE REPORT

## 2023-11-06 NOTE — DISCHARGE SUMMARY
[facility]                       [address] [address]    PATIENT NAME:       JULIO C HENRY   YOB: 2000  CSN:                [id number]   MRN:                [id number]  ADMIT DATE:         11/04/2023 22:10:00  PHYSICIAN:          Jose Leslie Jr, MD                          DISCHARGE SUMMARY    DATE OF DISCHARGE:  11/06/2023 00:00:00    DIAGNOSIS:  Status post vaginal delivery of an intrauterine fetal demise.    HOSPITAL COURSE:  The patient had vaginal delivery of a 38-week fetal demise by   Dr. Patricio Leslie, who was on-call.  She was admitted to the postpartum GYN   Service, where she had an uneventful and speedy recovery.  She is ambulating,   tolerating a regular diet.  Her vitals were stable.  She was afebrile.  She had   normal bowel and bladder function.  She will be discharged home on postpartum   day #1.    CONDITION ON DISCHARGE:  Stable.    DIET:  Regular.    ACTIVITY:  Pelvic rest.    MEDICATIONS:  Percocet p.r.n. and Motrin p.r.n.    FOLLOWUP:  Follow up with Dr. Leslie in 3 weeks.        ______________________________  Jose Leslie Jr, MD    DJE/AQS  DD:  11/06/2023  Time:  07:50AM  DT:  11/06/2023  Time:  07:59AM  Job #:  [id number]      DISCHARGE SUMMARY

## 2023-11-07 VITALS
RESPIRATION RATE: 16 BRPM | SYSTOLIC BLOOD PRESSURE: 135 MMHG | DIASTOLIC BLOOD PRESSURE: 75 MMHG | TEMPERATURE: 98 F | HEART RATE: 64 BPM

## 2023-11-07 PROCEDURE — 25000003 PHARM REV CODE 250: Performed by: OBSTETRICS & GYNECOLOGY

## 2023-11-07 RX ORDER — SERTRALINE HYDROCHLORIDE 25 MG/1
25 TABLET, FILM COATED ORAL DAILY
Status: DISCONTINUED | OUTPATIENT
Start: 2023-11-07 | End: 2023-11-07 | Stop reason: HOSPADM

## 2023-11-07 RX ORDER — SERTRALINE HYDROCHLORIDE 25 MG/1
100 TABLET, FILM COATED ORAL DAILY
Qty: 120 TABLET | Refills: 11 | Status: SHIPPED | OUTPATIENT
Start: 2023-11-07 | End: 2024-03-25

## 2023-11-07 RX ADMIN — IBUPROFEN 600 MG: 600 TABLET, FILM COATED ORAL at 12:11

## 2023-11-07 RX ADMIN — IBUPROFEN 600 MG: 600 TABLET, FILM COATED ORAL at 06:11

## 2023-11-07 RX ADMIN — SERTRALINE HYDROCHLORIDE 25 MG: 25 TABLET ORAL at 12:11

## 2023-11-08 LAB — PSYCHE PATHOLOGY RESULT: NORMAL

## 2023-11-08 NOTE — ANESTHESIA POSTPROCEDURE EVALUATION
Anesthesia Post Evaluation    Patient: Shani Stephens    Procedure(s) Performed: * No procedures listed *    Final Anesthesia Type: epidural      Patient location during evaluation: PACU  Patient participation: Yes- Able to Participate  Level of consciousness: awake and alert and oriented  Post-procedure vital signs: reviewed and stable  Pain management: adequate  Airway patency: patent  STAN mitigation strategies: Use of major conduction anesthesia (spinal/epidural) or peripheral nerve block  PONV status at discharge: No PONV  Anesthetic complications: no      Cardiovascular status: blood pressure returned to baseline and stable  Respiratory status: unassisted  Hydration status: euvolemic  Follow-up not needed.  Comments: EPIDURAL BLK RESOLVED , SENSORY MOTOR INTACT, DENIES HEADACHE.            Vitals Value Taken Time   /75 11/07/23 0804   Temp 36.8 °C (98.2 °F) 11/07/23 0804   Pulse 64 11/07/23 0804   Resp 16 11/07/23 0804       No case tracking events are documented in the log.      Pain/Leyla Score: Pain Rating Prior to Med Admin: 2 (11/7/2023 12:11 PM)

## 2024-03-25 ENCOUNTER — OFFICE VISIT (OUTPATIENT)
Dept: FAMILY MEDICINE | Facility: CLINIC | Age: 24
End: 2024-03-25
Payer: MEDICAID

## 2024-03-25 VITALS
TEMPERATURE: 97 F | RESPIRATION RATE: 18 BRPM | SYSTOLIC BLOOD PRESSURE: 100 MMHG | HEIGHT: 64 IN | WEIGHT: 151.63 LBS | BODY MASS INDEX: 25.89 KG/M2 | HEART RATE: 63 BPM | DIASTOLIC BLOOD PRESSURE: 68 MMHG

## 2024-03-25 DIAGNOSIS — Z30.011 ENCOUNTER FOR INITIAL PRESCRIPTION OF CONTRACEPTIVE PILLS: Primary | ICD-10-CM

## 2024-03-25 PROBLEM — Z3A.38 38 WEEKS GESTATION OF PREGNANCY: Status: RESOLVED | Noted: 2023-11-04 | Resolved: 2024-03-25

## 2024-03-25 PROBLEM — O36.4XX0 IUFD AT 20 WEEKS OR MORE OF GESTATION: Status: RESOLVED | Noted: 2023-11-04 | Resolved: 2024-03-25

## 2024-03-25 LAB
B-HCG UR QL: NEGATIVE
CTP QC/QA: YES

## 2024-03-25 PROCEDURE — 81025 URINE PREGNANCY TEST: CPT | Mod: PBBFAC

## 2024-03-25 PROCEDURE — 99213 OFFICE O/P EST LOW 20 MIN: CPT | Mod: PBBFAC

## 2024-03-25 RX ORDER — NORGESTIMATE AND ETHINYL ESTRADIOL 0.25-0.035
1 KIT ORAL DAILY
Qty: 30 TABLET | Refills: 11 | Status: SHIPPED | OUTPATIENT
Start: 2024-03-25 | End: 2025-03-25

## 2024-03-25 NOTE — PROGRESS NOTES
[facility] OFFICE VISIT NOTE  MRN: [id number]  Date: 03/25/2024    Chief Complaint: Follow-up (Patient here for annual checkup, Patient complaining of two menstrual cycles per month.)      Subjective:    LIBORIO Stephens is a 23 y.o. female  presenting to [facility] for :    Irregular Menstrual Cycles:  - patient had a recent still birth in 11/2023  - has one baby girl living with her currently, who is 7 years old; vaginal delivery; no complications  - patient states that she is coping well and declines counseling or therapy at this time. States the first few days were difficult for her but she has since improved with her positive thinking and coping.   - not currently taking any medications at this time  - March 9, 2024 was start of LMP; has been having 2 periods per month with first period lasting about 7 days and second period in month lasting about 3 days.  - has tried OCPs and depo shot in the past. Would like something to regulate her periods  - denies smoking history or history of blood clots such as PE or DVT.  - has mild cramping and periods are not heavier than usual  - denies dysuria, hematuria, or vaginal discharge  - periods are at the same time monthly  - Sexually active 3/23/24 denies condom usage;     Last pap in 01/09/2023 was ASCUS (no HPV testing done at this time) and needs to be repeated; will do at next [facility] visit with HPV cotesting at age 24 years old.      ROS as seen in HPI.      Healthcare Maintenance:  - HPV vaccination series completed upon chart review  - Pap from 01/2023 with ASCUS (no HPV testing done)  - denies personal or family history of breast or colon cancer  - denies smoking history; nonsmoker      Current Medications:   Current Outpatient Medications   Medication Sig Dispense Refill    norgestimate-ethinyl estradioL (ORTHO-CYCLEN) 0.25-35 mg-mcg per tablet Take 1 tablet by mouth once daily. 30 tablet 11     No current facility-administered medications for this visit.  "      Objective:  Blood pressure 100/68, pulse 63, temperature 97.3 °F (36.3 °C), temperature source Oral, resp. rate 18, height 5' 4" (1.626 m), weight 68.8 kg (151 lb 9.6 oz), last menstrual period 03/09/2024, unknown if currently breastfeeding.   Physical Exam  Constitutional:       General: She is not in acute distress.     Appearance: She is not ill-appearing.   Eyes:      General: No scleral icterus.     Extraocular Movements: Extraocular movements intact.      Conjunctiva/sclera: Conjunctivae normal.   Cardiovascular:      Rate and Rhythm: Normal rate and regular rhythm.      Heart sounds: Normal heart sounds. No murmur heard.  Pulmonary:      Effort: Pulmonary effort is normal. No respiratory distress.      Breath sounds: Normal breath sounds. No wheezing or rhonchi.   Abdominal:      General: There is no distension.      Palpations: Abdomen is soft.      Tenderness: There is no abdominal tenderness. There is no right CVA tenderness, left CVA tenderness, guarding or rebound.      Hernia: No hernia is present.   Musculoskeletal:         General: No tenderness.      Right lower leg: No edema.      Left lower leg: No edema.      Comments: B/L calves equal and nontender to palpation   Neurological:      Mental Status: She is alert.               Assessment/ Plan:    Encounter for initial prescription of contraceptive pills  - Polymenorrhea after birth likely to resolve; discussed with patient. Patient would still like to continue with oral contraception at this time.  - POCT urine pregnancy negative this [facility] visit  - Blood pressure within goal for age; BP noted to be 100/68 at today's [facility] visit  - No history of PE/DVT; nonsmoker  - START taking norgestimate-ethinyl estradioL (ORTHO-CYCLEN) 0.25-35 mg-mcg per tablet; Take 1 tablet by mouth once daily.  Dispense: 30 tablet; Refill: 11  - Encouraged condom usage; all other alternative options for contraception discussed with patient including all safe sex practices " in addition to contraceptive use. Medication side effects and risks discussed extensively; patient voiced her understanding.   - Encouraged patient to continue to take PNVs for benefit of folate intake. Patient has left over PNVs from previous pregnancy.        *Needs pap smear; routine labs done 2023 scanned into chart from Dr. Leslie.  Return to clinic in 3-6 months for BP check after starting OCPs on 03/25/2024, or sooner if needed.         Anne Wislon DO  [employer]  Resident, HO-2

## 2024-03-25 NOTE — LETTER
March 25, 2024      [facility] [address] [place] [address]  Phone: [phone]       Patient: Shani Stephens   YOB: 2000  Date of Visit: 03/25/2024    To Whom It May Concern:    Gelacio Stephens  was at [facility] on 03/25/2024. The patient may return to school on 03/26/2025 with no restrictions. If you have any questions or concerns, or if I can be of further assistance, please do not hesitate to contact me.    Sincerely,    Traci Morrow MA

## 2024-04-09 NOTE — PROGRESS NOTES
The chart was reviewed. I agree with the assessment and plan. Care provided was reasonable and necessary.

## 2024-08-13 ENCOUNTER — OFFICE VISIT (OUTPATIENT)
Dept: FAMILY MEDICINE | Facility: CLINIC | Age: 24
End: 2024-08-13
Payer: MEDICAID

## 2024-08-13 VITALS
SYSTOLIC BLOOD PRESSURE: 121 MMHG | RESPIRATION RATE: 20 BRPM | DIASTOLIC BLOOD PRESSURE: 70 MMHG | OXYGEN SATURATION: 99 % | TEMPERATURE: 98 F | HEART RATE: 73 BPM | HEIGHT: 64 IN | WEIGHT: 149 LBS | BODY MASS INDEX: 25.44 KG/M2

## 2024-08-13 DIAGNOSIS — Z79.3 BLOOD PRESSURE CHECK ON ORAL CONTRACEPTIVES: ICD-10-CM

## 2024-08-13 DIAGNOSIS — R10.2 SUPRAPUBIC PAIN: ICD-10-CM

## 2024-08-13 DIAGNOSIS — N89.8 VAGINAL DISCHARGE: Primary | ICD-10-CM

## 2024-08-13 DIAGNOSIS — Z01.30 BLOOD PRESSURE CHECK ON ORAL CONTRACEPTIVES: ICD-10-CM

## 2024-08-13 DIAGNOSIS — Z78.9 ATTEMPTING TO CONCEIVE: ICD-10-CM

## 2024-08-13 LAB
B-HCG UR QL: NEGATIVE
BILIRUB SERPL-MCNC: NEGATIVE MG/DL
BLOOD URINE, POC: NEGATIVE
C TRACH DNA SPEC QL NAA+PROBE: NOT DETECTED
CLARITY, POC UA: NORMAL
CLUE CELLS VAG QL WET PREP: ABNORMAL
COLOR, POC UA: YELLOW
CTP QC/QA: YES
GLUCOSE UR QL STRIP: NEGATIVE
KETONES UR QL STRIP: NEGATIVE
LEUKOCYTE ESTERASE URINE, POC: NEGATIVE
N GONORRHOEA DNA SPEC QL NAA+PROBE: NOT DETECTED
NITRITE, POC UA: NEGATIVE
PH, POC UA: 6
PROTEIN, POC: NEGATIVE
SOURCE (OHS): NORMAL
SPECIFIC GRAVITY, POC UA: 1.03
T VAGINALIS VAG QL WET PREP: ABNORMAL
UROBILINOGEN, POC UA: 0.2
WBC #/AREA VAG WET PREP: ABNORMAL
YEAST SPEC QL WET PREP: ABNORMAL

## 2024-08-13 PROCEDURE — 99213 OFFICE O/P EST LOW 20 MIN: CPT | Mod: PBBFAC

## 2024-08-13 PROCEDURE — 87491 CHLMYD TRACH DNA AMP PROBE: CPT

## 2024-08-13 PROCEDURE — 87625 HPV TYPES 16 & 18 ONLY: CPT | Mod: 59

## 2024-08-13 PROCEDURE — 87210 SMEAR WET MOUNT SALINE/INK: CPT

## 2024-08-13 PROCEDURE — 87591 N.GONORRHOEAE DNA AMP PROB: CPT

## 2024-08-13 PROCEDURE — 87625 HPV TYPES 16 & 18 ONLY: CPT

## 2024-08-13 PROCEDURE — 87624 HPV HI-RISK TYP POOLED RSLT: CPT

## 2024-08-13 NOTE — PROGRESS NOTES
[facility] Clinic Note    Subjective:     Patient ID: Shani Stephens is a 24 y.o. female    Chief Complaint:   Chief Complaint   Patient presents with    Follow-up     5 month visit ,states she thought her visit was for pap,complaints vag. Discharge,and abd. Pain ,irregular menses     HPI  Shani Stephens is a 24 y.o. female who presents for follow-up    Fetal Demise at 38 weeks 11/2023. Has 7 year old daughter  ASCUS 1/9/23.  Started on norgestimate-ethinyl estradiol 3/2024 to regulate menses. Stopped taking it in May 2024. Made patient feel sick.     Abd pain onset 3 days. Lower abdomen. Described as cramping sensation. Comes and goes. Ibuprofen 200-400mg has been mildly effective. Had a bladder infection last time this occurred. Last menses 8/2/24. Had one cycle in June. 2 cycles in July. No dysuria, or urgency. Has white vaginal discharge. Didn't have a smell initially, but odor intensified. Sexually active, not using protection. One partner. No chills headaches.     Review of Systems  Answers submitted by the patient for this visit:  Review of Systems Questionnaire (Submitted on 8/11/2024)  activity change: No  unexpected weight change: No  neck pain: No  hearing loss: No  rhinorrhea: No  trouble swallowing: No  eye discharge: No  visual disturbance: No  chest tightness: No  wheezing: No  chest pain: No  palpitations: No  blood in stool: No  constipation: No  vomiting: No  diarrhea: No  polydipsia: No  polyuria: No  difficulty urinating: No  hematuria: No  menstrual problem: No  dysuria: No  joint swelling: No  arthralgias: No  headaches: No  weakness: No  confusion: No  dysphoric mood: No    Objective:         11/5/2023     3:08 PM 3/25/2024    10:14 AM 8/13/2024     8:20 AM   Vitals - 1 value per visit   SYSTOLIC  100 121   DIASTOLIC  68 70   Pulse  63 73   Temp  97.3 °F (36.3 °C) 98.4 °F (36.9 °C)   Resp 16 18 20   SPO2   99 %   Weight (lb)  151.6 149   Weight (kg)  68.765 67.586   Height  5'  "4" (1.626 m) 5' 4" (1.626 m)   BMI (Calculated)  26 25.6   Pain Score   Four     Physical Exam  Constitutional:       General: She is not in acute distress.     Appearance: She is not ill-appearing.   Cardiovascular:      Rate and Rhythm: Normal rate and regular rhythm.      Heart sounds: No murmur heard.     No gallop.   Pulmonary:      Effort: No respiratory distress.      Breath sounds: Normal breath sounds. No wheezing or rales.   Genitourinary:     General: Normal vulva.      Labia:         Right: No lesion.         Left: No lesion.       Vagina: Normal.      Cervix: Normal. No cervical bleeding.      Comments: Performed by Staff (Dr. Lopez)  Scant clear discharge  Skin:     General: Skin is warm and dry.        08/13/24 10:37   Clarity, UA Slightly Cloudy   Color, UA Yellow   pH, UA 6.0   Spec Grav UA 1.030   Blood, UA negative   WBC, UA negative   Nitrite, UA negative   Glucose, UA negative   Bilirubin, POC negative   Protein, POC negative   Ketones, UA negative   Urobilinogen, UA 0.2      Latest Reference Range & Units 08/13/24 09:14   hCG Qualitative, Urine Negative  Negative   POCT URINE PREGNANCY  Rpt    Acceptable  Yes   Rpt: View report in Results Review for more information  Assessment & Plan     Vaginal discharge  STD screenings ordered  -     Liquid-based pap smear, screening  -     Liquid-Based Pap Smear, Screening  -     HPV High Risk Genotypes, PCR  -     Chlamydia/GC, PCR  -     Wet Prep, Genital    Blood pressure check on oral contraceptives  Attempting to Conceive  BP well controlled  Patient stopped taking OCP in May 2024  Desires another pregnancy  Patient counseled to begin PNVs  Counseled on ovulation timing; having regular unprotected intercourse days 10-20 of her cycle  [place] every 1-2 day while in the 10-20 day window    3. Suprapubic pain  UPT negative  UA WNL  Tylenol for symptom relief  -     POCT Urine Pregnancy  -     POCT URINE DIPSTICK WITHOUT " MICROSCOPE      Orders Placed This Encounter    HPV High Risk Genotypes, PCR    Chlamydia/GC, PCR    Wet Prep, Genital    POCT Urine Pregnancy    POCT URINE DIPSTICK WITHOUT MICROSCOPE    Liquid-based pap smear, screening    Liquid-Based Pap Smear, Screening     Health Maintenance   Topic Date Due    Lipid Panel  Never done    TETANUS VACCINE  03/23/2021    Pap Smear  01/09/2026    Hepatitis C Screening  Completed    HPV Vaccines  Completed     Future Appointments   Date Time Provider Department Center   12/30/2024  8:00 AM Anne Wilson DO [facility]     RTC in 4 months    Ricardo Lizama MD  [employer] [facility], PGY-2

## 2024-08-13 NOTE — LETTER
August 13, 2024      [facility] [address] [place] [address]  Phone: [phone]       Patient: Shani Stephens   YOB: 2000  Date of Visit: 08/13/2024    To Whom It May Concern:    Gelacio Stephens  was at [facility] on 08/13/2024. The patient may return to work on 08/13/2024. If you have any questions or concerns, or if I can be of further assistance, please do not hesitate to contact me.    Sincerely,    Ricardo Lizama MD

## 2024-08-16 LAB
HIGH RISK HPV 16: NEGATIVE
HIGH RISK HPV 18/45: NEGATIVE
HIGH RISK HPV: POSITIVE
PSYCHE PATHOLOGY RESULT: NORMAL

## 2024-08-25 ENCOUNTER — HOSPITAL ENCOUNTER (EMERGENCY)
Facility: HOSPITAL | Age: 24
Discharge: HOME | End: 2024-08-25
Attending: EMERGENCY MEDICINE
Payer: MEDICAID

## 2024-08-25 VITALS
SYSTOLIC BLOOD PRESSURE: 113 MMHG | HEIGHT: 64 IN | OXYGEN SATURATION: 99 % | RESPIRATION RATE: 17 BRPM | DIASTOLIC BLOOD PRESSURE: 65 MMHG | WEIGHT: 149.69 LBS | HEART RATE: 77 BPM | TEMPERATURE: 98 F | BODY MASS INDEX: 25.56 KG/M2

## 2024-08-25 DIAGNOSIS — Z32.01 POSITIVE PREGNANCY TEST: Primary | ICD-10-CM

## 2024-08-25 LAB
ALBUMIN SERPL-MCNC: 4 G/DL (ref 3.5–5)
ALBUMIN/GLOB SERPL: 1 RATIO (ref 1.1–2)
ALP SERPL-CCNC: 63 UNIT/L (ref 40–150)
ALT SERPL-CCNC: 14 UNIT/L (ref 0–55)
ANION GAP SERPL CALC-SCNC: 7 MEQ/L
AST SERPL-CCNC: 16 UNIT/L (ref 5–34)
B-HCG FREE SERPL-ACNC: 59.17 MIU/ML
B-HCG UR QL: POSITIVE
BACTERIA #/AREA URNS AUTO: ABNORMAL /HPF
BASOPHILS # BLD AUTO: 0.03 X10(3)/MCL
BASOPHILS NFR BLD AUTO: 0.3 %
BILIRUB SERPL-MCNC: 0.4 MG/DL
BILIRUB UR QL STRIP.AUTO: NEGATIVE
BUN SERPL-MCNC: 16.4 MG/DL (ref 7–18.7)
CALCIUM SERPL-MCNC: 9.5 MG/DL (ref 8.4–10.2)
CHLORIDE SERPL-SCNC: 108 MMOL/L (ref 98–107)
CLARITY UR: CLEAR
CO2 SERPL-SCNC: 23 MMOL/L (ref 22–29)
COLOR UR AUTO: ABNORMAL
CREAT SERPL-MCNC: 0.88 MG/DL (ref 0.55–1.02)
CREAT/UREA NIT SERPL: 19
CTP QC/QA: YES
EOSINOPHIL # BLD AUTO: 0.06 X10(3)/MCL (ref 0–0.9)
EOSINOPHIL NFR BLD AUTO: 0.7 %
ERYTHROCYTE [DISTWIDTH] IN BLOOD BY AUTOMATED COUNT: 13 % (ref 11.5–17)
GFR SERPLBLD CREATININE-BSD FMLA CKD-EPI: >60 ML/MIN/1.73/M2
GLOBULIN SER-MCNC: 4.1 GM/DL (ref 2.4–3.5)
GLUCOSE SERPL-MCNC: 99 MG/DL (ref 74–100)
GLUCOSE UR QL STRIP: NORMAL
HCT VFR BLD AUTO: 37.9 % (ref 37–47)
HGB BLD-MCNC: 12.5 G/DL (ref 12–16)
HGB UR QL STRIP: NEGATIVE
HOLD SPECIMEN: NORMAL
HYALINE CASTS #/AREA URNS LPF: ABNORMAL /LPF
IMM GRANULOCYTES # BLD AUTO: 0.02 X10(3)/MCL (ref 0–0.04)
IMM GRANULOCYTES NFR BLD AUTO: 0.2 %
KETONES UR QL STRIP: NEGATIVE
LEUKOCYTE ESTERASE UR QL STRIP: NEGATIVE
LYMPHOCYTES # BLD AUTO: 1.5 X10(3)/MCL (ref 0.6–4.6)
LYMPHOCYTES NFR BLD AUTO: 17 %
MCH RBC QN AUTO: 26.9 PG (ref 27–31)
MCHC RBC AUTO-ENTMCNC: 33 G/DL (ref 33–36)
MCV RBC AUTO: 81.5 FL (ref 80–94)
MONOCYTES # BLD AUTO: 0.43 X10(3)/MCL (ref 0.1–1.3)
MONOCYTES NFR BLD AUTO: 4.9 %
MUCOUS THREADS URNS QL MICRO: ABNORMAL /LPF
NEUTROPHILS # BLD AUTO: 6.77 X10(3)/MCL (ref 2.1–9.2)
NEUTROPHILS NFR BLD AUTO: 76.9 %
NITRITE UR QL STRIP: NEGATIVE
NRBC BLD AUTO-RTO: 0 %
PH UR STRIP: 6.5 [PH]
PLATELET # BLD AUTO: 188 X10(3)/MCL (ref 130–400)
PMV BLD AUTO: 10.1 FL (ref 7.4–10.4)
POTASSIUM SERPL-SCNC: 4.2 MMOL/L (ref 3.5–5.1)
PROT SERPL-MCNC: 8.1 GM/DL (ref 6.4–8.3)
PROT UR QL STRIP: ABNORMAL
RBC # BLD AUTO: 4.65 X10(6)/MCL (ref 4.2–5.4)
RBC #/AREA URNS AUTO: ABNORMAL /HPF
SODIUM SERPL-SCNC: 138 MMOL/L (ref 136–145)
SP GR UR STRIP.AUTO: 1.03 (ref 1–1.03)
SQUAMOUS #/AREA URNS LPF: ABNORMAL /HPF
UROBILINOGEN UR STRIP-ACNC: NORMAL
WBC # BLD AUTO: 8.81 X10(3)/MCL (ref 4.5–11.5)
WBC #/AREA URNS AUTO: ABNORMAL /HPF

## 2024-08-25 PROCEDURE — 81001 URINALYSIS AUTO W/SCOPE: CPT | Performed by: PHYSICIAN ASSISTANT

## 2024-08-25 PROCEDURE — 84702 CHORIONIC GONADOTROPIN TEST: CPT | Performed by: PHYSICIAN ASSISTANT

## 2024-08-25 PROCEDURE — 81025 URINE PREGNANCY TEST: CPT | Performed by: PHYSICIAN ASSISTANT

## 2024-08-25 PROCEDURE — 85025 COMPLETE CBC W/AUTO DIFF WBC: CPT | Performed by: PHYSICIAN ASSISTANT

## 2024-08-25 PROCEDURE — 80053 COMPREHEN METABOLIC PANEL: CPT | Performed by: PHYSICIAN ASSISTANT

## 2024-08-25 PROCEDURE — 99283 EMERGENCY DEPT VISIT LOW MDM: CPT

## 2024-08-25 RX ORDER — PRENATAL WITH FERROUS FUM AND FOLIC ACID 3080; 920; 120; 400; 22; 1.84; 3; 20; 10; 1; 12; 200; 27; 25; 2 [IU]/1; [IU]/1; MG/1; [IU]/1; MG/1; MG/1; MG/1; MG/1; MG/1; MG/1; UG/1; MG/1; MG/1; MG/1; MG/1
1 TABLET ORAL DAILY
Qty: 30 TABLET | Refills: 11 | Status: SHIPPED | OUTPATIENT
Start: 2024-08-25 | End: 2025-08-25

## 2024-08-26 NOTE — ED PROVIDER NOTES
Encounter Date: 2024       History     Chief Complaint   Patient presents with    Abdominal Pain     Lower abd pain    Rash     Facial breakouts  per pt     Shani Stephens is a 24 y.o. female  who presents to the ED with complaints of lower abdominal cramping that comes and goes that started 4 day(s) ago. She denies nausea, vomiting, diarrhea. She denies dysuria, hematuria, vaginal bleeding, and vaginal discharge. She states she had a stillborn last year and is very nervous that she is pregnant. Recently saw GYN earlier this month for her annual pap smear.       The history is provided by the patient. No  was used.     Review of patient's allergies indicates:  No Known Allergies  History reviewed. No pertinent past medical history.  History reviewed. No pertinent surgical history.  Family History   Problem Relation Name Age of Onset    No Known Problems Mother      No Known Problems Father       Social History     Tobacco Use    Smoking status: Never    Smokeless tobacco: Never   Substance Use Topics    Alcohol use: Never    Drug use: Never     Review of Systems   Constitutional:  Negative for chills, fatigue and fever.   HENT:  Negative for congestion, ear pain, sinus pain and sore throat.    Eyes:  Negative for pain.   Respiratory:  Negative for cough, chest tightness and shortness of breath.    Cardiovascular:  Negative for chest pain.   Gastrointestinal:  Positive for abdominal pain. Negative for constipation, diarrhea, nausea and vomiting.   Genitourinary:  Negative for dysuria, flank pain, pelvic pain and urgency.   Musculoskeletal:  Negative for back pain and joint swelling.   Skin:  Negative for color change and rash.   Neurological:  Negative for dizziness and weakness.   Psychiatric/Behavioral:  Negative for behavioral problems and confusion.        Physical Exam     Initial Vitals [24 1916]   BP Pulse Resp Temp SpO2   113/65 77 17 97.9 °F (36.6 °C) 99 %      MAP        --         Physical Exam    Nursing note and vitals reviewed.  Constitutional: She appears well-developed and well-nourished.   HENT:   Head: Normocephalic and atraumatic.   Nose: Nose normal.   Eyes: EOM are normal. Pupils are equal, round, and reactive to light.   Neck: Neck supple. No thyromegaly present. No JVD present.   Normal range of motion.  Cardiovascular:  Normal rate, regular rhythm, normal heart sounds and intact distal pulses.           No murmur heard.  Pulmonary/Chest: Breath sounds normal. No respiratory distress. She has no wheezes. She has no rhonchi. She has no rales. She exhibits no tenderness.   Abdominal: Abdomen is soft. Bowel sounds are normal. She exhibits no distension. There is no abdominal tenderness. There is no rebound and no guarding.   Musculoskeletal:         General: No tenderness or edema. Normal range of motion.      Cervical back: Normal range of motion and neck supple.     Lymphadenopathy:     She has no cervical adenopathy.   Neurological: She is alert and oriented to person, place, and time.   Skin: Skin is warm and dry. Capillary refill takes less than 2 seconds.   Psychiatric: She has a normal mood and affect. Thought content normal.         ED Course   Procedures  Labs Reviewed   COMPREHENSIVE METABOLIC PANEL - Abnormal       Result Value    Sodium 138      Potassium 4.2      Chloride 108 (*)     CO2 23      Glucose 99      Blood Urea Nitrogen 16.4      Creatinine 0.88      Calcium 9.5      Protein Total 8.1      Albumin 4.0      Globulin 4.1 (*)     Albumin/Globulin Ratio 1.0 (*)     Bilirubin Total 0.4      ALP 63      ALT 14      AST 16      eGFR >60      Anion Gap 7.0      BUN/Creatinine Ratio 19     URINALYSIS - Abnormal    Color, UA Light-Yellow      Appearance, UA Clear      Specific Gravity, UA 1.034 (*)     pH, UA 6.5      Protein, UA Trace (*)     Glucose, UA Normal      Ketones, UA Negative      Blood, UA Negative      Bilirubin, UA Negative      Urobilinogen,  UA Normal      Nitrites, UA Negative      Leukocyte Esterase, UA Negative      RBC, UA 0-5      WBC, UA 0-5      Bacteria, UA None Seen      Squamous Epithelial Cells, UA Occ (*)     Mucous, UA Trace (*)     Hyaline Casts, UA None Seen     HCG, QUANTITATIVE - Abnormal    Beta HCG Quant 59.17 (*)    CBC WITH DIFFERENTIAL - Abnormal    WBC 8.81      RBC 4.65      Hgb 12.5      Hct 37.9      MCV 81.5      MCH 26.9 (*)     MCHC 33.0      RDW 13.0      Platelet 188      MPV 10.1      Neut % 76.9      Lymph % 17.0      Mono % 4.9      Eos % 0.7      Basophil % 0.3      Lymph # 1.50      Neut # 6.77      Mono # 0.43      Eos # 0.06      Baso # 0.03      IG# 0.02      IG% 0.2      NRBC% 0.0     POCT URINE PREGNANCY - Abnormal    POC Preg Test, Ur Positive (*)      Acceptable Yes     CBC W/ AUTO DIFFERENTIAL    Narrative:     The following orders were created for panel order CBC Auto Differential.  Procedure                               Abnormality         Status                     ---------                               -----------         ------                     CBC with Differential[[id number]]       Abnormal            Final result                 Please view results for these tests on the individual orders.   EXTRA TUBES    Narrative:     The following orders were created for panel order EXTRA TUBES.  Procedure                               Abnormality         Status                     ---------                               -----------         ------                     Gold Top Hold[[id number]]                                   In process                   Please view results for these tests on the individual orders.   GOLD TOP HOLD          Imaging Results    None          Medications - No data to display  Medical Decision Making  DDX: early pregnancy, UTI, gastroenteritis, among others    Shani Stephens is a 24 y.o. female  who presents to the ED with complaints of lower abdominal  cramping that comes and goes that started 4 day(s) ago. She denies nausea, vomiting, diarrhea. She denies dysuria, hematuria, vaginal bleeding, and vaginal discharge. She states she had a stillborn last year and is very nervous that she is pregnant. Recently saw GYN earlier this month for her annual pap smear.     Hospital Course: Labs obtained. CBC, CMP wnl. Urinalysis without infection. UPT positive. Beta hcg elevated slightly at 59. Likely very early pregnancy. No need for US at this time. I have given her strict return precautions and I have referred her to OB here at [facility]. Will rx PNV. Stable for discharge.     Amount and/or Complexity of Data Reviewed  Labs: ordered.                                      Clinical Impression:  Final diagnoses:  [Z32.01] Positive pregnancy test (Primary)          ED Disposition Condition    Discharge Stable          ED Prescriptions       Medication Sig Dispense Start Date End Date Auth. Provider    PNV,calcium 72/iron/folic acid (PRENATAL VITAMIN) Tab Take 1 tablet by mouth once daily. 30 tablet 8/25/2024 8/25/2025 Cleopatra Corbett PA-C          Follow-up Information       Follow up With Specialties Details Why Contact Info    Anne Wilson DO Family Medicine   [address] [facility] [address]  [phone]      [facility] Emergency Medicine In 3 days As needed, If symptoms worsen [address] [facility] [address] [phone]             Cleopatra Corbett PA-C  08/25/24 2058

## 2024-12-01 ENCOUNTER — HOSPITAL ENCOUNTER (EMERGENCY)
Facility: HOSPITAL | Age: 24
Discharge: HOME | End: 2024-12-01
Attending: EMERGENCY MEDICINE
Payer: MEDICAID

## 2024-12-01 VITALS
TEMPERATURE: 98 F | HEART RATE: 61 BPM | SYSTOLIC BLOOD PRESSURE: 120 MMHG | DIASTOLIC BLOOD PRESSURE: 71 MMHG | RESPIRATION RATE: 20 BRPM | OXYGEN SATURATION: 100 % | WEIGHT: 153.44 LBS | HEIGHT: 65 IN | BODY MASS INDEX: 25.56 KG/M2

## 2024-12-01 DIAGNOSIS — N30.00 ACUTE CYSTITIS WITHOUT HEMATURIA: Primary | ICD-10-CM

## 2024-12-01 DIAGNOSIS — K59.00 CONSTIPATION: ICD-10-CM

## 2024-12-01 LAB
ALBUMIN SERPL-MCNC: 4 G/DL (ref 3.5–5)
ALBUMIN/GLOB SERPL: 1 RATIO (ref 1.1–2)
ALP SERPL-CCNC: 57 UNIT/L (ref 40–150)
ALT SERPL-CCNC: 11 UNIT/L (ref 0–55)
AMORPH URATE CRY URNS QL MICRO: ABNORMAL /UL
ANION GAP SERPL CALC-SCNC: 6 MEQ/L
AST SERPL-CCNC: 14 UNIT/L (ref 5–34)
B-HCG FREE SERPL-ACNC: <2.42 MIU/ML
B-HCG UR QL: POSITIVE
BACTERIA #/AREA URNS AUTO: ABNORMAL /HPF
BASOPHILS # BLD AUTO: 0.02 X10(3)/MCL
BASOPHILS NFR BLD AUTO: 0.3 %
BILIRUB SERPL-MCNC: 0.8 MG/DL
BILIRUB UR QL STRIP.AUTO: NEGATIVE
BUN SERPL-MCNC: 11 MG/DL (ref 7–18.7)
CALCIUM SERPL-MCNC: 9.7 MG/DL (ref 8.4–10.2)
CHLORIDE SERPL-SCNC: 106 MMOL/L (ref 98–107)
CLARITY UR: ABNORMAL
CO2 SERPL-SCNC: 23 MMOL/L (ref 22–29)
COLOR UR AUTO: YELLOW
CREAT SERPL-MCNC: 0.73 MG/DL (ref 0.55–1.02)
CREAT/UREA NIT SERPL: 15
CTP QC/QA: YES
EOSINOPHIL # BLD AUTO: 0.04 X10(3)/MCL (ref 0–0.9)
EOSINOPHIL NFR BLD AUTO: 0.6 %
ERYTHROCYTE [DISTWIDTH] IN BLOOD BY AUTOMATED COUNT: 12.6 % (ref 11.5–17)
GFR SERPLBLD CREATININE-BSD FMLA CKD-EPI: >60 ML/MIN/1.73/M2
GLOBULIN SER-MCNC: 4.2 GM/DL (ref 2.4–3.5)
GLUCOSE SERPL-MCNC: 83 MG/DL (ref 74–100)
GLUCOSE UR QL STRIP: NORMAL
HCT VFR BLD AUTO: 39.1 % (ref 37–47)
HGB BLD-MCNC: 13 G/DL (ref 12–16)
HGB UR QL STRIP: NEGATIVE
HOLD SPECIMEN: NORMAL
HYALINE CASTS #/AREA URNS LPF: ABNORMAL /LPF
IMM GRANULOCYTES # BLD AUTO: 0.02 X10(3)/MCL (ref 0–0.04)
IMM GRANULOCYTES NFR BLD AUTO: 0.3 %
KETONES UR QL STRIP: ABNORMAL
LEUKOCYTE ESTERASE UR QL STRIP: 75
LYMPHOCYTES # BLD AUTO: 1.3 X10(3)/MCL (ref 0.6–4.6)
LYMPHOCYTES NFR BLD AUTO: 18.4 %
MCH RBC QN AUTO: 26.2 PG (ref 27–31)
MCHC RBC AUTO-ENTMCNC: 33.2 G/DL (ref 33–36)
MCV RBC AUTO: 78.7 FL (ref 80–94)
MONOCYTES # BLD AUTO: 0.47 X10(3)/MCL (ref 0.1–1.3)
MONOCYTES NFR BLD AUTO: 6.7 %
MUCOUS THREADS URNS QL MICRO: ABNORMAL /LPF
NEUTROPHILS # BLD AUTO: 5.21 X10(3)/MCL (ref 2.1–9.2)
NEUTROPHILS NFR BLD AUTO: 73.7 %
NITRITE UR QL STRIP: NEGATIVE
NRBC BLD AUTO-RTO: 0 %
PH UR STRIP: 7 [PH]
PLATELET # BLD AUTO: 203 X10(3)/MCL (ref 130–400)
PMV BLD AUTO: 9.7 FL (ref 7.4–10.4)
POTASSIUM SERPL-SCNC: 3.7 MMOL/L (ref 3.5–5.1)
PROT SERPL-MCNC: 8.2 GM/DL (ref 6.4–8.3)
PROT UR QL STRIP: ABNORMAL
RBC # BLD AUTO: 4.97 X10(6)/MCL (ref 4.2–5.4)
RBC #/AREA URNS AUTO: ABNORMAL /HPF
SODIUM SERPL-SCNC: 135 MMOL/L (ref 136–145)
SP GR UR STRIP.AUTO: 1.03 (ref 1–1.03)
SQUAMOUS #/AREA URNS LPF: ABNORMAL /HPF
TRI-PHOS CRY UR QL COMP ASSIST: ABNORMAL
UROBILINOGEN UR STRIP-ACNC: ABNORMAL
WBC # BLD AUTO: 7.06 X10(3)/MCL (ref 4.5–11.5)
WBC #/AREA URNS AUTO: ABNORMAL /HPF

## 2024-12-01 PROCEDURE — 99283 EMERGENCY DEPT VISIT LOW MDM: CPT

## 2024-12-01 PROCEDURE — 87086 URINE CULTURE/COLONY COUNT: CPT

## 2024-12-01 PROCEDURE — 81001 URINALYSIS AUTO W/SCOPE: CPT

## 2024-12-01 PROCEDURE — 84702 CHORIONIC GONADOTROPIN TEST: CPT

## 2024-12-01 PROCEDURE — 81025 URINE PREGNANCY TEST: CPT

## 2024-12-01 PROCEDURE — 85025 COMPLETE CBC W/AUTO DIFF WBC: CPT

## 2024-12-01 PROCEDURE — 80053 COMPREHEN METABOLIC PANEL: CPT

## 2024-12-01 RX ORDER — HYOSCYAMINE SULFATE 0.12 MG/1
0.12 TABLET SUBLINGUAL EVERY 4 HOURS PRN
Qty: 9 TABLET | Refills: 0 | Status: SHIPPED | OUTPATIENT
Start: 2024-12-01 | End: 2024-12-05

## 2024-12-01 RX ORDER — CEPHALEXIN 500 MG/1
500 CAPSULE ORAL 4 TIMES DAILY
Qty: 28 CAPSULE | Refills: 0 | Status: SHIPPED | OUTPATIENT
Start: 2024-12-01 | End: 2024-12-08

## 2024-12-01 RX ORDER — DOCUSATE SODIUM 100 MG/1
100 CAPSULE, LIQUID FILLED ORAL 2 TIMES DAILY
Qty: 14 CAPSULE | Refills: 0 | Status: SHIPPED | OUTPATIENT
Start: 2024-12-01 | End: 2024-12-08

## 2024-12-01 NOTE — ED PROVIDER NOTES
Encounter Date: 2024       History     Chief Complaint   Patient presents with    Fatigue     Reports intermittent abd pain, generalized weakness and lack of appetite x1 week. Denies n/v/d/urinary complaints.      24-year-old female presenting to [facility] emergency department with a chief complaint of intermittent mid abdominal cramping with nausea and fatigue for 1 week.  Last menstrual period 2004, with a miscarriage at 7 weeks, with a D&C that followed on 2024.  She has not had a menstrual cycle since her last menstrual period in her D&C.  Her POC pregnancy is positive she is  4 para 1, 1 living child born in , 1 stillborn last 2023, 1 miscarriage in  at 7 weeks.  Dr. Villegas at [facility] is her OB.  She denies vaginal bleeding discharge dysuria fever nausea vomiting flank pain diarrhea.  She endorses constipation for the past week.  No other complaints    The history is provided by the patient.     Review of patient's allergies indicates:  No Known Allergies  History reviewed. No pertinent past medical history.  History reviewed. No pertinent surgical history.  Family History   Problem Relation Name Age of Onset    No Known Problems Mother      No Known Problems Father       Social History     Tobacco Use    Smoking status: Never    Smokeless tobacco: Never   Substance Use Topics    Alcohol use: Never    Drug use: Never     Review of Systems   Constitutional:  Positive for appetite change and fatigue. Negative for activity change, chills, diaphoresis, fever and unexpected weight change.   HENT: Negative.     Eyes: Negative.    Respiratory: Negative.     Cardiovascular: Negative.    Gastrointestinal:  Positive for abdominal pain and nausea. Negative for abdominal distention, anal bleeding, blood in stool, constipation, diarrhea, rectal pain and vomiting.   Endocrine: Negative.    Genitourinary: Negative.    Musculoskeletal: Negative.    Skin: Negative.     Allergic/Immunologic: Negative.    Neurological: Negative.    All other systems reviewed and are negative.      Physical Exam     Initial Vitals [12/01/24 1336]   BP Pulse Resp Temp SpO2   112/72 70 20 98.1 °F (36.7 °C) 100 %      MAP       --         Physical Exam    Nursing note and vitals reviewed.  Constitutional: Vital signs are normal. She appears well-developed and well-nourished. She is not diaphoretic. She is cooperative.  Non-toxic appearance. She does not have a sickly appearance. She does not appear ill. No distress.   Nontoxic appearance   HENT:   Head: Normocephalic and atraumatic.   Right Ear: Hearing normal.   Left Ear: Hearing normal.   Nose: Nose normal. Mouth/Throat: Uvula is midline and oropharynx is clear and moist. Mucous membranes are dry.   Eyes: Conjunctivae, EOM and lids are normal. Pupils are equal, round, and reactive to light.   Neck: Trachea normal and phonation normal. Neck supple.   Normal range of motion.  Cardiovascular:  Normal rate, regular rhythm, normal heart sounds, intact distal pulses and normal pulses.           Pulmonary/Chest: Effort normal and breath sounds normal. No accessory muscle usage. No tachypnea and no bradypnea. No respiratory distress. She has no decreased breath sounds. She has no wheezes. She has no rhonchi. She has no rales.   Normal effort, symmetrical chest rise and fall, no accessory muscle use. Bilateral clear breath sounds in all fields anterior and posterior.      Abdominal: Abdomen is soft. Bowel sounds are normal. She exhibits no distension and no mass. There is no abdominal tenderness.   Abdomen is soft, nontender, no peritoneal signs.   No right CVA tenderness.  No left CVA tenderness. There is no rebound, no guarding, no tenderness at McBurney's point and negative Sadler's sign. negative Rovsing's sign  Musculoskeletal:         General: Normal range of motion.      Cervical back: Normal range of motion and neck supple.      Comments: Full ROM in  all four extremities, equal strength 5/5, NVI     Neurological: She is alert and oriented to person, place, and time. She has normal strength. She displays no atrophy and no tremor. She exhibits normal muscle tone. She displays a negative Romberg sign. Coordination and gait normal. GCS score is 15. GCS eye subscore is 4. GCS verbal subscore is 5. GCS motor subscore is 6.   Skin: Skin is warm, dry and intact. Capillary refill takes less than 2 seconds. No pallor.   Psychiatric: She has a normal mood and affect. Thought content normal.         ED Course   Procedures  Labs Reviewed   COMPREHENSIVE METABOLIC PANEL - Abnormal       Result Value    Sodium 135 (*)     Potassium 3.7      Chloride 106      CO2 23      Glucose 83      Blood Urea Nitrogen 11.0      Creatinine 0.73      Calcium 9.7      Protein Total 8.2      Albumin 4.0      Globulin 4.2 (*)     Albumin/Globulin Ratio 1.0 (*)     Bilirubin Total 0.8      ALP 57      ALT 11      AST 14      eGFR >60      Anion Gap 6.0      BUN/Creatinine Ratio 15     URINALYSIS, REFLEX TO URINE CULTURE - Abnormal    Color, UA Yellow      Appearance, UA Turbid (*)     Specific Gravity, UA 1.035 (*)     pH, UA 7.0      Protein, UA 1+ (*)     Glucose, UA Normal      Ketones, UA Trace (*)     Blood, UA Negative      Bilirubin, UA Negative      Urobilinogen, UA 1+ (*)     Nitrites, UA Negative      Leukocyte Esterase, UA 75 (*)     RBC, UA 0-5      WBC, UA 11-20 (*)     Bacteria, UA Moderate (*)     Squamous Epithelial Cells, UA Many (*)     Mucous, UA Moderate (*)     Hyaline Casts, UA None Seen      Triple Phosphate Crystals, UA Rare (*)     Amorphous Crystal, UA Few     CBC WITH DIFFERENTIAL - Abnormal    WBC 7.06      RBC 4.97      Hgb 13.0      Hct 39.1      MCV 78.7 (*)     MCH 26.2 (*)     MCHC 33.2      RDW 12.6      Platelet 203      MPV 9.7      Neut % 73.7      Lymph % 18.4      Mono % 6.7      Eos % 0.6      Basophil % 0.3      Lymph # 1.30      Neut # 5.21      Mono #  0.47      Eos # 0.04      Baso # 0.02      IG# 0.02      IG% 0.3      NRBC% 0.0     POCT URINE PREGNANCY - Abnormal    POC Preg Test, Ur Positive (*)      Acceptable Yes     HCG, QUANTITATIVE - Normal    Beta HCG Quant <2.42     CULTURE, URINE   CBC W/ AUTO DIFFERENTIAL    Narrative:     The following orders were created for panel order CBC W/ AUTO DIFFERENTIAL.  Procedure                               Abnormality         Status                     ---------                               -----------         ------                     CBC with Differential[[id number]]       Abnormal            Final result                 Please view results for these tests on the individual orders.   EXTRA TUBES    Narrative:     The following orders were created for panel order EXTRA TUBES.  Procedure                               Abnormality         Status                     ---------                               -----------         ------                     Light Blue Top Hold[[id number]]                             In process                 Light Green Top Hold[[id number]]                            In process                 Gold Top Hold[[id number]]                                   In process                   Please view results for these tests on the individual orders.   LIGHT BLUE TOP HOLD   LIGHT GREEN TOP HOLD   GOLD TOP HOLD          Imaging Results    None          Medications - No data to display  Medical Decision Making  Appendicitis, Diverticulitis, Pancreatitis, Pyelonephritis, AAA, Dissection, MI, Gastric Ulcer, Peptic Ulcer, Urinary retention, among others    Intermittent abdominal cramping, nausea, fatigue x 1 week. Feels like she is dehyrdated, requesting IV fluids. POC pregnancy positive, patient unaware she is pregnant. Had D&C on 10/1/24 with Dr Villegas at [facility] for a 7 week failed miscarrriage. She has not had a menstrual cycle since 7/25/24.     CBC  CMP  UA  POC Pregnancy positive  Added  HCG Quant level    Dr Villegas is OBGYN    Amount and/or Complexity of Data Reviewed  Labs: ordered. Decision-making details documented in ED Course.               ED Course as of 12/01/24 1557   Sun Dec 01, 2024   1459 Positive pregnancy test [RQ]   1511 Comp. Metabolic Panel(!)  Normal [RQ]   1511 CBC W/ AUTO DIFFERENTIAL(!)  Normal [RQ]   1511 POCT urine pregnancy(!)  Positive [RQ]   1521 Comp. Metabolic Panel(!) [RQ]   1522 Urinalysis, Reflex to Urine Culture(!)  Appearance, UA Turbid  Specific Gravity,UA 1.035  Protein, UA 1+  Ketones, UA Trace  Urobilinogen, UA 1+  Leukocyte Esterase, UA 75  WBC, UA 11-20  Bacteria, UA Moderate  Squamous Epithelial Cells, UA Many  Mucous, UA Moderate  Triplephos Ann, UA Rare     [RQ]   1522 Stable and WNL [RQ]      ED Course User Index  [RQ] Tonia Myers FNP                           Clinical Impression:  Final diagnoses:  [K59.00] Constipation  [N30.00] Acute cystitis without hematuria (Primary)          ED Disposition Condition    Discharge Stable          ED Prescriptions       Medication Sig Dispense Start Date End Date Auth. Provider    cephALEXin (KEFLEX) 500 MG capsule Take 1 capsule (500 mg total) by mouth 4 (four) times daily. for 7 days 28 capsule 12/1/2024 12/8/2024 Tonia Myers FNP    docusate sodium (COLACE) 100 MG capsule Take 1 capsule (100 mg total) by mouth 2 (two) times daily. for 7 days 14 capsule 12/1/2024 12/8/2024 Tonia Myers FNP    psyllium (HYDROCIL) packet Take 1 packet by mouth once daily. for 7 days 7 packet 12/1/2024 12/8/2024 Tonia Myers FNP    hyoscyamine 0.125 mg Subl Place 1 tablet (0.125 mg total) under the tongue every 4 (four) hours as needed. 9 tablet 12/1/2024 12/4/2024 Tonia Myers FNP          Follow-up Information       Follow up With Specialties Details Why Contact Info    Anne Wilson DO Family Medicine In 1 week  [address] [facility] [address] [phone]      [facility] - Emergency Dept  Emergency Medicine  If symptoms worsen [address] [facility] [id number] [phone]             Tonia Myers FNP  12/01/24 1553

## 2024-12-04 LAB — BACTERIA UR CULT: NORMAL

## 2024-12-05 ENCOUNTER — HOSPITAL ENCOUNTER (OUTPATIENT)
Dept: RADIOLOGY | Facility: HOSPITAL | Age: 24
Discharge: HOME | End: 2024-12-05
Payer: MEDICAID

## 2024-12-05 ENCOUNTER — OFFICE VISIT (OUTPATIENT)
Dept: FAMILY MEDICINE | Facility: CLINIC | Age: 24
End: 2024-12-05
Payer: MEDICAID

## 2024-12-05 VITALS
TEMPERATURE: 98 F | HEIGHT: 65 IN | HEART RATE: 77 BPM | DIASTOLIC BLOOD PRESSURE: 63 MMHG | WEIGHT: 149.38 LBS | OXYGEN SATURATION: 100 % | BODY MASS INDEX: 24.89 KG/M2 | SYSTOLIC BLOOD PRESSURE: 107 MMHG

## 2024-12-05 DIAGNOSIS — Z51.89 FOLLOW-UP VISIT AFTER MISCARRIAGE: ICD-10-CM

## 2024-12-05 DIAGNOSIS — Z32.01 POSITIVE PREGNANCY TEST: ICD-10-CM

## 2024-12-05 DIAGNOSIS — O09.299 PRIOR PREGNANCY WITH FETAL DEMISE: ICD-10-CM

## 2024-12-05 DIAGNOSIS — O03.9 FOLLOW-UP VISIT AFTER MISCARRIAGE: ICD-10-CM

## 2024-12-05 DIAGNOSIS — R11.0 NAUSEA: ICD-10-CM

## 2024-12-05 DIAGNOSIS — Z32.01 POSITIVE PREGNANCY TEST: Primary | ICD-10-CM

## 2024-12-05 DIAGNOSIS — R10.84 GENERALIZED ABDOMINAL PAIN: ICD-10-CM

## 2024-12-05 LAB
B-HCG FREE SERPL-ACNC: ABNORMAL MIU/ML
B-HCG UR QL: POSITIVE
CTP QC/QA: YES

## 2024-12-05 PROCEDURE — 76801 OB US < 14 WKS SINGLE FETUS: CPT | Mod: TC

## 2024-12-05 PROCEDURE — 86147 CARDIOLIPIN ANTIBODY EA IG: CPT

## 2024-12-05 PROCEDURE — 36415 COLL VENOUS BLD VENIPUNCTURE: CPT

## 2024-12-05 PROCEDURE — 84702 CHORIONIC GONADOTROPIN TEST: CPT

## 2024-12-05 PROCEDURE — 86146 BETA-2 GLYCOPROTEIN ANTIBODY: CPT

## 2024-12-05 PROCEDURE — 99214 OFFICE O/P EST MOD 30 MIN: CPT | Mod: PBBFAC,25

## 2024-12-05 RX ORDER — ONDANSETRON 4 MG/1
4 TABLET, ORALLY DISINTEGRATING ORAL DAILY PRN
Qty: 15 TABLET | Refills: 0 | Status: SHIPPED | OUTPATIENT
Start: 2024-12-05 | End: 2024-12-05

## 2024-12-05 RX ORDER — FAMOTIDINE 40 MG/1
40 TABLET, FILM COATED ORAL DAILY
Qty: 90 TABLET | Refills: 0 | Status: SHIPPED | OUTPATIENT
Start: 2024-12-05

## 2024-12-05 RX ORDER — PRENATAL WITH FERROUS FUM AND FOLIC ACID 3080; 920; 120; 400; 22; 1.84; 3; 20; 10; 1; 12; 200; 27; 25; 2 [IU]/1; [IU]/1; MG/1; [IU]/1; MG/1; MG/1; MG/1; MG/1; MG/1; MG/1; UG/1; MG/1; MG/1; MG/1; MG/1
1 TABLET ORAL DAILY
Qty: 30 TABLET | Refills: 11 | Status: SHIPPED | OUTPATIENT
Start: 2024-12-05 | End: 2025-12-05

## 2024-12-05 NOTE — PROGRESS NOTES
[facility] OFFICE VISIT NOTE  MRN: [id number]  Date: 2024    Chief Complaint: Follow-up (ED)      Subjective:    LIBORIO Stephens is a 24 y.o. female  presenting to [facility] for ER follow up after being seen at [facility] ER on 24 for GI pain, fatigue and weakness. She was discharged from the ER that same day with diagnosis of acute cystitis treated with Keflex which she is still taking (urine culture negative) and chronic constipation, which she was prescribed colace, psyllium, and hyoscyamine for but is not taking. Denies constipation stating she had normal BM daily with last being yesterday.    LMP was around 2024 prior to D&C done in 10/2024. Currently sexually active and not taking OCPs since they made her stomach upset. Has been nauseated with intermittent vomiting for about 1 week now. Nausea worse with food.  Had positive UPT in ER on 24 but underwent D&C on 10/1/24 at [facility] with OBGYN physician, Dr. Villegas due to missed incomplete  of a desired pregnancy. Beta quant in ER on 24 was <2.42.  Patient also lost infant to fetal demise at 38 weeks GA on 23 the previous year.   Denies known family or personal history of phospholipid syndrome, blood clots, or miscarriages.      ROS as seen in HPI.      Healthcare Maintenance:  - HPV vaccination series completed upon chart review  - Pap from 2024 done in [facility] showing NIL, positive HR-HPV  - denies personal or family history of breast or colon cancer  - denies smoking history; nonsmoker      Current Medications:   Current Outpatient Medications   Medication Sig Dispense Refill    cephALEXin (KEFLEX) 500 MG capsule Take 1 capsule (500 mg total) by mouth 4 (four) times daily. for 7 days 28 capsule 0    docusate sodium (COLACE) 100 MG capsule Take 1 capsule (100 mg total) by mouth 2 (two) times daily. for 7 days 14 capsule 0    ondansetron (ZOFRAN-ODT) 4 MG TbDL Take 1 tablet (4 mg total) by mouth daily as needed (nausea). 15 tablet  "0    PNV,calcium 72/iron/folic acid (PRENATAL VITAMIN) Tab Take 1 tablet by mouth once daily. 30 tablet 11     No current facility-administered medications for this visit.       Objective:  Blood pressure 107/63, pulse 77, temperature 98 °F (36.7 °C), temperature source Oral, height 5' 5" (1.651 m), weight 67.8 kg (149 lb 6.4 oz), last menstrual period 07/25/2024, SpO2 100%.   Physical Exam  Constitutional:       General: She is not in acute distress.     Appearance: She is not ill-appearing.   Eyes:      General: No scleral icterus.     Extraocular Movements: Extraocular movements intact.      Conjunctiva/sclera: Conjunctivae normal.   Cardiovascular:      Rate and Rhythm: Normal rate and regular rhythm.      Heart sounds: No murmur heard.  Pulmonary:      Effort: Pulmonary effort is normal. No respiratory distress.      Breath sounds: No wheezing or rhonchi.   Abdominal:      General: There is no distension.      Palpations: Abdomen is soft.      Tenderness: There is no abdominal tenderness. There is no guarding.      Hernia: No hernia is present.   Musculoskeletal:         General: No tenderness.      Right lower leg: No edema.      Left lower leg: No edema.      Comments: B/L calves equal and nontender to palpation   Neurological:      Mental Status: She is alert.         Assessment/ Plan:    Positive pregnancy test  - UPT positive this visit  - [place] [facility] [place], which revealed single viable IUP with a crown-rump length indicative of 8 weeks and 1 day gestation  - patient instructed to begin taking prenatal vitamin as prescribed.  She was given list of safe medications to take during pregnancy  - Ambulatory referral/consult to Family Practice for initial OB visit to be scheduled    Generalized abdominal pain  Nausea and vomiting  - likely due to current pregnancy in first trimester  - prescribed Famotidine 40 mg daily for nausea but encourage patient to use Unisom/B6 combination for " nausea given current pregnancy    Prior pregnancy with fetal demise  Follow-up visit after miscarriage  - High Spec Antiphospholipid Ab (IgG, IgM), Beta-2 Glycoprotein I Ab (IgG/IgM), and beta HCG Quantitative collected this visit to rule out antiphospholipid syndrome  - beta HCG quant returned elevated at 116,630; was <2.42 last checked in ER on 12/1/24      Return to clinic in 5-6 months for HCM and routine follow up, or sooner if needed.       Anne Wilson DO  [employer]  Resident, -3

## 2024-12-09 ENCOUNTER — OFFICE VISIT (OUTPATIENT)
Dept: FAMILY MEDICINE | Facility: CLINIC | Age: 24
End: 2024-12-09
Payer: MEDICAID

## 2024-12-09 VITALS
TEMPERATURE: 99 F | HEART RATE: 74 BPM | OXYGEN SATURATION: 98 % | DIASTOLIC BLOOD PRESSURE: 73 MMHG | HEIGHT: 65 IN | BODY MASS INDEX: 24.39 KG/M2 | RESPIRATION RATE: 20 BRPM | WEIGHT: 146.38 LBS | SYSTOLIC BLOOD PRESSURE: 115 MMHG

## 2024-12-09 DIAGNOSIS — O26.859 SPOTTING IN PREGNANCY: Primary | ICD-10-CM

## 2024-12-09 LAB
B-HCG FREE SERPL-ACNC: ABNORMAL MIU/ML
BACTERIA #/AREA URNS AUTO: ABNORMAL /HPF
BILIRUB SERPL-MCNC: NEGATIVE MG/DL
BILIRUB UR QL STRIP.AUTO: NEGATIVE
BLOOD URINE, POC: NORMAL
CLARITY UR: ABNORMAL
CLARITY, POC UA: NORMAL
COLOR UR AUTO: YELLOW
COLOR, POC UA: YELLOW
GLUCOSE UR QL STRIP: NEGATIVE
GLUCOSE UR QL STRIP: NORMAL
HGB UR QL STRIP: ABNORMAL
HYALINE CASTS #/AREA URNS LPF: ABNORMAL /LPF
KETONES UR QL STRIP: NEGATIVE
KETONES UR QL STRIP: NEGATIVE
LEUKOCYTE ESTERASE UR QL STRIP: 75
LEUKOCYTE ESTERASE URINE, POC: NORMAL
MUCOUS THREADS URNS QL MICRO: ABNORMAL /LPF
NITRITE UR QL STRIP: NEGATIVE
NITRITE, POC UA: NEGATIVE
PH UR STRIP: 7 [PH]
PH, POC UA: 7
PROT UR QL STRIP: ABNORMAL
PROTEIN, POC: NORMAL
RBC #/AREA URNS AUTO: ABNORMAL /HPF
SP GR UR STRIP.AUTO: 1.03 (ref 1–1.03)
SPECIFIC GRAVITY, POC UA: 1.02
SQUAMOUS #/AREA URNS LPF: ABNORMAL /HPF
UROBILINOGEN UR STRIP-ACNC: NORMAL
UROBILINOGEN, POC UA: NORMAL
WBC #/AREA URNS AUTO: ABNORMAL /HPF

## 2024-12-09 PROCEDURE — 87086 URINE CULTURE/COLONY COUNT: CPT

## 2024-12-09 PROCEDURE — 84702 CHORIONIC GONADOTROPIN TEST: CPT

## 2024-12-09 PROCEDURE — 36415 COLL VENOUS BLD VENIPUNCTURE: CPT

## 2024-12-09 PROCEDURE — 99214 OFFICE O/P EST MOD 30 MIN: CPT | Mod: PBBFAC

## 2024-12-09 PROCEDURE — 81015 MICROSCOPIC EXAM OF URINE: CPT

## 2024-12-09 NOTE — PROGRESS NOTES
[facility] FM Clinic Progress Note    ID:  Shani Stephens   MRN:  [id number]     2024    Chief Complaint:    Chief Complaint   Patient presents with    Vaginal Bleeding     Pt states the she noticed the blood this morning.      History of Present Illness:  Shani Stephens is a 24 y.o. female who presents to [facility] FM clinic for:     Conditions addressed this visit:  Spotting in early pregnancy:  at 8^5 WGA. Reports minimal vaginal bleeding onset this morning. Noticed blood on toilet paper, denies mily blood in toilet bowl. Reports minimal abdominal pain/cramping that has been presents for many weeks and is at baseline and not worsening. Had positive UPT and u/s on  with viable IUP @ 8wga. Reports history of 2 miscarriages over the last year. APLS labs obtained at last visit and still pending. Denies vaginal discharge, trauma, dysuria, urinary frequency.    Medication List with Changes/Refills   Current Medications    FAMOTIDINE (PEPCID) 40 MG TABLET    Take 1 tablet (40 mg total) by mouth once daily.    PNV,CALCIUM 72/IRON/FOLIC ACID (PRENATAL VITAMIN) TAB    Take 1 tablet by mouth once daily.       Review of Systems:  Pertinent positives and negatives as mentioned in HPI    Objective:    Vitals:    24 0926   BP: 115/73   Pulse: 74   Resp: 20   Temp: 98.5 °F (36.9 °C)       Physical Exam  Vitals reviewed.   Eyes:      Extraocular Movements: Extraocular movements intact.   Cardiovascular:      Rate and Rhythm: Normal rate.   Pulmonary:      Effort: Pulmonary effort is normal. No respiratory distress.   Musculoskeletal:      Right lower leg: No edema.      Left lower leg: No edema.   Skin:     General: Skin is warm and dry.      Capillary Refill: Capillary refill takes less than 2 seconds.      Coloration: Skin is not pale.   Neurological:      Mental Status: She is alert and oriented to person, place, and time.   Psychiatric:         Behavior: Behavior normal.       Assessment/Plan:  Shani malagon  seen today for vaginal bleeding.    Diagnoses and all orders for this visit:    Spotting in pregnancy  -     speculum exam not performed in setting of previa cannot be r/o  -     doppler unlikely to detect fetus <10WGA  -     will obtain beta hcg, expect to be similar value as before, if significant change, will obtain repeat u/s and have patient return for repeat beta hcg in 48hrs.  -     obtain UA and culture to evaluate for infection.   -     CTM for symptoms and ED precaution discussed including worsening or continued vaginal bleeding, fever/chills. Educational handout provided.       Follow up if symptoms worsen or fail to improve.    Future Appointments   Date Time Provider Department Center   1/9/2025 10:30 AM [place] [address] OB [facility] Ayden Villarreal   1/9/2025 11:00 AM INITIAL OB, [facility] RESIDENTS [facility] LEXA Hensley Un     Jigna Mckeon MD  [facility]

## 2024-12-11 LAB — BACTERIA UR CULT: NORMAL

## 2024-12-21 ENCOUNTER — HOSPITAL ENCOUNTER (EMERGENCY)
Facility: HOSPITAL | Age: 24
Discharge: HOME | End: 2024-12-21
Attending: EMERGENCY MEDICINE
Payer: MEDICAID

## 2024-12-21 VITALS
TEMPERATURE: 98 F | OXYGEN SATURATION: 100 % | HEIGHT: 66 IN | DIASTOLIC BLOOD PRESSURE: 75 MMHG | SYSTOLIC BLOOD PRESSURE: 130 MMHG | HEART RATE: 77 BPM | WEIGHT: 150.69 LBS | RESPIRATION RATE: 18 BRPM | BODY MASS INDEX: 24.22 KG/M2

## 2024-12-21 DIAGNOSIS — O20.0 THREATENED ABORTION: Primary | ICD-10-CM

## 2024-12-21 LAB
ANION GAP SERPL CALC-SCNC: 6 MEQ/L
B-HCG FREE SERPL-ACNC: ABNORMAL MIU/ML
BACTERIA #/AREA URNS AUTO: ABNORMAL /HPF
BASOPHILS # BLD AUTO: 0.02 X10(3)/MCL
BASOPHILS NFR BLD AUTO: 0.3 %
BILIRUB UR QL STRIP.AUTO: NEGATIVE
BUN SERPL-MCNC: 12.2 MG/DL (ref 7–18.7)
CALCIUM SERPL-MCNC: 9.4 MG/DL (ref 8.4–10.2)
CHLORIDE SERPL-SCNC: 108 MMOL/L (ref 98–107)
CLARITY UR: ABNORMAL
CO2 SERPL-SCNC: 23 MMOL/L (ref 22–29)
COLOR UR AUTO: ABNORMAL
CREAT SERPL-MCNC: 0.69 MG/DL (ref 0.55–1.02)
CREAT/UREA NIT SERPL: 18
EOSINOPHIL # BLD AUTO: 0.04 X10(3)/MCL (ref 0–0.9)
EOSINOPHIL NFR BLD AUTO: 0.6 %
ERYTHROCYTE [DISTWIDTH] IN BLOOD BY AUTOMATED COUNT: 12.6 % (ref 11.5–17)
GFR SERPLBLD CREATININE-BSD FMLA CKD-EPI: >60 ML/MIN/1.73/M2
GLUCOSE SERPL-MCNC: 93 MG/DL (ref 74–100)
GLUCOSE UR QL STRIP: NORMAL
GROUP & RH: NORMAL
HCT VFR BLD AUTO: 35.6 % (ref 37–47)
HGB BLD-MCNC: 11.5 G/DL (ref 12–16)
HGB UR QL STRIP: ABNORMAL
HYALINE CASTS #/AREA URNS LPF: ABNORMAL /LPF
IMM GRANULOCYTES # BLD AUTO: 0.02 X10(3)/MCL (ref 0–0.04)
IMM GRANULOCYTES NFR BLD AUTO: 0.3 %
INDIRECT COOMBS: NORMAL
KETONES UR QL STRIP: NEGATIVE
LEUKOCYTE ESTERASE UR QL STRIP: NEGATIVE
LYMPHOCYTES # BLD AUTO: 1.22 X10(3)/MCL (ref 0.6–4.6)
LYMPHOCYTES NFR BLD AUTO: 17.3 %
MCH RBC QN AUTO: 26.1 PG (ref 27–31)
MCHC RBC AUTO-ENTMCNC: 32.3 G/DL (ref 33–36)
MCV RBC AUTO: 80.7 FL (ref 80–94)
MONOCYTES # BLD AUTO: 0.52 X10(3)/MCL (ref 0.1–1.3)
MONOCYTES NFR BLD AUTO: 7.4 %
MUCOUS THREADS URNS QL MICRO: ABNORMAL /LPF
NEUTROPHILS # BLD AUTO: 5.23 X10(3)/MCL (ref 2.1–9.2)
NEUTROPHILS NFR BLD AUTO: 74.1 %
NITRITE UR QL STRIP: NEGATIVE
NRBC BLD AUTO-RTO: 0 %
PH UR STRIP: 7 [PH]
PLATELET # BLD AUTO: 194 X10(3)/MCL (ref 130–400)
PMV BLD AUTO: 10 FL (ref 7.4–10.4)
POTASSIUM SERPL-SCNC: 3.4 MMOL/L (ref 3.5–5.1)
PROT UR QL STRIP: ABNORMAL
RBC # BLD AUTO: 4.41 X10(6)/MCL (ref 4.2–5.4)
RBC #/AREA URNS AUTO: >100 /HPF
SODIUM SERPL-SCNC: 137 MMOL/L (ref 136–145)
SP GR UR STRIP.AUTO: 1.03 (ref 1–1.03)
SPECIMEN OUTDATE: NORMAL
SQUAMOUS #/AREA URNS LPF: ABNORMAL /HPF
UROBILINOGEN UR STRIP-ACNC: NORMAL
WBC # BLD AUTO: 7.05 X10(3)/MCL (ref 4.5–11.5)
WBC #/AREA URNS AUTO: ABNORMAL /HPF

## 2024-12-21 PROCEDURE — 86850 RBC ANTIBODY SCREEN: CPT | Performed by: EMERGENCY MEDICINE

## 2024-12-21 PROCEDURE — 99284 EMERGENCY DEPT VISIT MOD MDM: CPT | Mod: 25

## 2024-12-21 PROCEDURE — 80048 BASIC METABOLIC PNL TOTAL CA: CPT | Performed by: EMERGENCY MEDICINE

## 2024-12-21 PROCEDURE — 84702 CHORIONIC GONADOTROPIN TEST: CPT | Performed by: EMERGENCY MEDICINE

## 2024-12-21 PROCEDURE — 85025 COMPLETE CBC W/AUTO DIFF WBC: CPT | Performed by: EMERGENCY MEDICINE

## 2024-12-21 PROCEDURE — 81001 URINALYSIS AUTO W/SCOPE: CPT | Performed by: EMERGENCY MEDICINE

## 2024-12-22 NOTE — ED PROVIDER NOTES
Encounter Date: 2024       History     Chief Complaint   Patient presents with    Vaginal Bleeding     States vaginal bleeding is  and 10 weeks pregnant.  Recent miscarriage prior to this pregnancy.  States has gone though 1 pad since 6pm when bleeding started.  Denies pain.       25-year-old female here 10 weeks gestation with known IUP on ultrasound data .  Patient with history of spontaneous miscarriage, here with no pain, no cramping, no nausea, no vomiting, no dysuria, urgency, frequency.        Review of patient's allergies indicates:  No Known Allergies  Past Medical History:   Diagnosis Date    Missed  10/2024    Prior pregnancy with fetal demise 2023    at 38 weeks GA     Past Surgical History:   Procedure Laterality Date    DILATION AND CURETTAGE OF UTERUS  10/2024    missed incomplete      Family History   Problem Relation Name Age of Onset    No Known Problems Mother      No Known Problems Father       Social History     Tobacco Use    Smoking status: Never    Smokeless tobacco: Never   Substance Use Topics    Alcohol use: Never    Drug use: Never     Review of Systems    Physical Exam     Initial Vitals [24]   BP Pulse Resp Temp SpO2   138/78 78 17 97.9 °F (36.6 °C) 100 %      MAP       --         Physical Exam    Constitutional: She appears well-developed and well-nourished.   HENT:   Head: Normocephalic and atraumatic.   Eyes: Conjunctivae and EOM are normal. Pupils are equal, round, and reactive to light.   Neck: No tracheal deviation present.   Normal range of motion.  Cardiovascular:  Normal rate, regular rhythm and normal heart sounds.           Pulmonary/Chest: Breath sounds normal. No respiratory distress. She exhibits no tenderness.   Abdominal: Abdomen is soft. She exhibits no distension. There is no abdominal tenderness. There is no rebound.   Musculoskeletal:         General: No tenderness. Normal range of motion.      Cervical back:  Normal range of motion.     Neurological: She is alert and oriented to person, place, and time. GCS score is 15. GCS eye subscore is 4. GCS verbal subscore is 5. GCS motor subscore is 6.   Skin: No rash and no abscess noted.   Psychiatric: She has a normal mood and affect. Her behavior is normal. Judgment and thought content normal.         ED Course   Procedures  Labs Reviewed   BASIC METABOLIC PANEL - Abnormal       Result Value    Sodium 137      Potassium 3.4 (*)     Chloride 108 (*)     CO2 23      Glucose 93      Blood Urea Nitrogen 12.2      Creatinine 0.69      BUN/Creatinine Ratio 18      Calcium 9.4      Anion Gap 6.0      eGFR >60     URINALYSIS, REFLEX TO URINE CULTURE - Abnormal    Color, UA Light-Orange (*)     Appearance, UA Extra Turbid (*)     Specific Gravity, UA 1.031 (*)     pH, UA 7.0      Protein, UA 2+ (*)     Glucose, UA Normal      Ketones, UA Negative      Blood, UA 3+ (*)     Bilirubin, UA Negative      Urobilinogen, UA Normal      Nitrites, UA Negative      Leukocyte Esterase, UA Negative      RBC, UA >100 (*)     WBC, UA None Seen      Bacteria, UA None Seen      Squamous Epithelial Cells, UA Occasional (*)     Mucous, UA Trace (*)     Hyaline Casts, UA None Seen     HCG, QUANTITATIVE - Abnormal    Beta HCG Quant 88,671.14 (*)    CBC WITH DIFFERENTIAL - Abnormal    WBC 7.05      RBC 4.41      Hgb 11.5 (*)     Hct 35.6 (*)     MCV 80.7      MCH 26.1 (*)     MCHC 32.3 (*)     RDW 12.6      Platelet 194      MPV 10.0      Neut % 74.1      Lymph % 17.3      Mono % 7.4      Eos % 0.6      Basophil % 0.3      Lymph # 1.22      Neut # 5.23      Mono # 0.52      Eos # 0.04      Baso # 0.02      IG# 0.02      IG% 0.3      NRBC% 0.0     CBC W/ AUTO DIFFERENTIAL    Narrative:     The following orders were created for panel order CBC auto differential.  Procedure                               Abnormality         Status                     ---------                               -----------          ------                     CBC with Differential[[id number]]       Abnormal            Final result                 Please view results for these tests on the individual orders.   TYPE & SCREEN    Group & Rh B POS      Specimen Outdate 2024 23:59            Imaging Results              US OB <14 Wks TransAbd & TransVag, Single Gestation (XPD) (In process)                   X-Rays:   Independently Interpreted Readings:   Other Readings:  Live IUP on ultrasound;    Medications - No data to display  Medical Decision Making  Amount and/or Complexity of Data Reviewed  Labs: ordered. Decision-making details documented in ED Course.     Details: As above;  Radiology: ordered and independent interpretation performed. Decision-making details documented in ED Course.     Details: Live IUP on ultrasound;               ED Course as of 24   Sat Dec 21, 2024   2109 Reassuring hemogram; [CT]    hematuria on urinalysis, unconvincing as UTI; [CT]    Reassuring chemistries; [CT]    Quantitative beta hCG today 88,000; [CT]    Records demonstrate blood type B positive; [CT]      ED Course User Index  [CT] Dilan Clements MD                           Clinical Impression:  Final diagnoses:  [O20.0] Threatened  (Primary)          ED Disposition Condition    Discharge Stable          ED Prescriptions    None       Follow-up Information       Follow up With Specialties Details Why Contact Info    [facility] Dept Emergency Medicine  As needed, If symptoms worsen [address] [facility] [id number] [phone]    Anne Wilson, DO Family Medicine Call   [address] [facility] [address]  [phone]               Dilan Clements MD  24 0309

## 2024-12-23 ENCOUNTER — OFFICE VISIT (OUTPATIENT)
Dept: FAMILY MEDICINE | Facility: CLINIC | Age: 24
End: 2024-12-23
Payer: MEDICAID

## 2024-12-23 VITALS
SYSTOLIC BLOOD PRESSURE: 119 MMHG | OXYGEN SATURATION: 97 % | DIASTOLIC BLOOD PRESSURE: 81 MMHG | HEART RATE: 84 BPM | TEMPERATURE: 99 F | WEIGHT: 147 LBS | HEIGHT: 65 IN | BODY MASS INDEX: 24.49 KG/M2

## 2024-12-23 DIAGNOSIS — N93.9 VAGINAL BLEEDING: Primary | ICD-10-CM

## 2024-12-23 LAB — B-HCG FREE SERPL-ACNC: ABNORMAL MIU/ML

## 2024-12-23 PROCEDURE — 84702 CHORIONIC GONADOTROPIN TEST: CPT

## 2024-12-23 PROCEDURE — 36415 COLL VENOUS BLD VENIPUNCTURE: CPT

## 2024-12-23 PROCEDURE — 99213 OFFICE O/P EST LOW 20 MIN: CPT | Mod: PBBFAC

## 2024-12-23 NOTE — PROGRESS NOTES
"[facility] OFFICE VISIT NOTE  Shani Stephens  [id number]  2024    Chief Complaint   Patient presents with    Hospital F/U       Shani Stephens is a 24 y.o.  at 11w0d on most recent US presenting to [facility] for ED follow up.  She had ED evaluation 24 for vaginal bleeding.   Beta hCG quant: 88,000  1st trimester US done at the time: Single live intrauterine pregnancy with ultrasound age by crown rump length 10 weeks 5 days.     Today, states bleeding on toilet paper after wiping still persists.   Denies pelvic pain, vaginal discharge, dysuria, frequency, n/v.   Denies lightheadedness, fatigue, palpitations, dyspnea.     Previous clinic note 24 reviewed:   Had positive UPT in ER on 24 but underwent D&C on 10/1/24 at [facility] with OBGYN physician, Dr. Villegas due to missed incomplete  of a desired pregnancy. Beta quant in ER on 24 was <2.42. Patient also lost infant to fetal demise at 38 weeks GA on 23 the previous year. Denies known family or personal history of phospholipid syndrome, blood clots, or miscarriages.    Subsequent workup for antiphospholipid syndrome negative so far.     Review of Systems: see HPI    Blood pressure 119/81, pulse 84, temperature 98.9 °F (37.2 °C), temperature source Oral, height 5' 5" (1.651 m), weight 66.7 kg (147 lb), last menstrual period 2024, SpO2 97%.   Physical Exam  Constitutional:       General: She is not in acute distress.     Appearance: Normal appearance.   HENT:      Mouth/Throat:      Mouth: Mucous membranes are moist.   Eyes:      Conjunctiva/sclera: Conjunctivae normal.   Cardiovascular:      Rate and Rhythm: Normal rate and regular rhythm.      Pulses: Normal pulses.      Heart sounds: No murmur heard.  Pulmonary:      Effort: Pulmonary effort is normal. No respiratory distress.      Breath sounds: Normal breath sounds.   Abdominal:      General: Bowel sounds are normal.      Palpations: Abdomen is soft. "   Genitourinary:     Comments: External genitalia without erythema, exudate or discharge. Vaginal vault is without discharge. Cervix is of normal color without lesion. The os is closed. Scant old brown streak of blood around os. No active bleeding noted.     Musculoskeletal:      Right lower leg: No edema.      Left lower leg: No edema.   Skin:     General: Skin is warm and dry.      Capillary Refill: Capillary refill takes less than 2 seconds.      Coloration: Skin is not pale.   Neurological:      Mental Status: She is alert.           Current Medications:   Current Outpatient Medications   Medication Sig Dispense Refill    famotidine (PEPCID) 40 MG tablet Take 1 tablet (40 mg total) by mouth once daily. 90 tablet 0    PNV,calcium 72/iron/folic acid (PRENATAL VITAMIN) Tab Take 1 tablet by mouth once daily. 30 tablet 11     No current facility-administered medications for this visit.       Assessment:   1. Vaginal bleeding        Plan:  - repeat hcg quant today  - repeat urine today  - strict ED precautions discussed    Orders Placed This Encounter    HCG, Quantitative    POCT URINE DIPSTICK WITHOUT MICROSCOPE     Return to clinic in 1 week for vaginal bleeding, or sooner if needed. Patient to establish OB care with Dr. Leslie has appt scheduled 1/6/25.     Kamla Bloom  [facility] FM Resident

## 2024-12-27 NOTE — PROGRESS NOTES
I reviewed History, PE, A/P and medical record.  Services provided in outpatient department of a teaching hospital/facility, I was immediately available.  I agree with resident. Care provided was reasonable and necessary.   I discussed the patient with resident at time of visit.  Cervix closed without active bleeding. 11 week IUP based on US from 12/2024. BHCG seems to have plateaud.   Unclear HPI prior to 12/5/2024. ED precautions. Plans to follow with Dr. Leslie

## 2024-12-30 ENCOUNTER — OFFICE VISIT (OUTPATIENT)
Dept: FAMILY MEDICINE | Facility: CLINIC | Age: 24
End: 2024-12-30
Payer: MEDICAID

## 2024-12-30 VITALS
WEIGHT: 145.63 LBS | DIASTOLIC BLOOD PRESSURE: 75 MMHG | HEIGHT: 65 IN | TEMPERATURE: 99 F | SYSTOLIC BLOOD PRESSURE: 113 MMHG | HEART RATE: 73 BPM | OXYGEN SATURATION: 99 % | BODY MASS INDEX: 24.26 KG/M2

## 2024-12-30 DIAGNOSIS — Z3A.12 12 WEEKS GESTATION OF PREGNANCY: ICD-10-CM

## 2024-12-30 DIAGNOSIS — N93.9 VAGINAL BLEEDING: Primary | ICD-10-CM

## 2024-12-30 PROCEDURE — 99213 OFFICE O/P EST LOW 20 MIN: CPT | Mod: PBBFAC

## 2024-12-30 NOTE — LETTER
December 30, 2024    Shani Schwartz [facility] [address]             [facility] [address] [facility] [address]  Phone: [phone] The above individual was seen by our clinic on 12/30/24. Please excuse from 12/30/24 - 1/6/25.    If you have any questions or concerns, please don't hesitate to call.    Sincerely,        Ricardo Lizama MD

## 2024-12-30 NOTE — PROGRESS NOTES
"[facility] Clinic Note    Subjective:     Patient ID: Shani Stephens is a 24 y.o. female    Chief Complaint:   Chief Complaint   Patient presents with    Follow-up    Vaginal Bleeding       HPI  Shani Stephens is a 24 y.o. female who presents for follow-up for vaginal bleeding     at 12+0 . Bleeding described as spotting ; Was seen 24 for the same complaint. At thatt time, a beta hcg quant was done. Single live intrauterine pregnancy was confirmed by ultrasound at a prior ED visit. Pt states the spotting she has now is different (less than) what she has experienced with prior miscarriage. Pt has OB f/u with Dr. Leslie on 25     Current Outpatient Medications   Medication Instructions    famotidine (PEPCID) 40 mg, Oral, Daily    PNV,calcium 72/iron/folic acid (PRENATAL VITAMIN) Tab 1 tablet, Oral, Daily     Review of Systems  As per HPI    Objective:         2024     8:41 PM 2024     3:37 PM 2024     4:02 PM   Vitals - 1 value per visit   SYSTOLIC 138 119 113   DIASTOLIC 78 81 75   Pulse 78 84 73   Temp 97.9 °F (36.6 °C) 98.9 °F (37.2 °C) 98.9 °F (37.2 °C)   Resp 17     SPO2 100 % 97 % 99 %   Weight (lb) 150.68 147 145.6   Weight (kg) 68.35 66.679 66.044   Height 5' 5.75" (1.67 m) 5' 5" (1.651 m) 5' 5" (1.651 m)   BMI (Calculated) 24.5 24.5 24.2     Physical Exam  Vitals reviewed.   Constitutional:       General: She is not in acute distress.     Appearance: She is normal weight.   Cardiovascular:      Rate and Rhythm: Normal rate and regular rhythm.      Heart sounds: No murmur heard.  Pulmonary:      Effort: No respiratory distress.      Breath sounds: No wheezing.   Abdominal:      General: There is no distension.      Palpations: Abdomen is soft.      Tenderness: There is no abdominal tenderness. There is no guarding or rebound.      exam deferred     Attempted to get FHT, but was unable to    Assessment & Plan     1. Vaginal bleeding  Considered bHCG, but not sure " how useful it would be.  Minor drop could be physiologic  Patient to keep her initial OB visit with Dr. Leslie  If bleeding increases, pt is to go to the ED for u/s    2. 12 weeks gestation of pregnancy  Has initial OB visit with Dr. Leslie scheduled  Unable to get FHT w/doppler, but patient is only at 12 weeks  Pt to f/u with OB       Health Maintenance   Topic Date Due    Lipid Panel  Never done    TETANUS VACCINE  03/23/2021    Influenza Vaccine (1) Never done    COVID-19 Vaccine (3 - 2024-25 season) 09/01/2024    Pap Smear  08/13/2027    RSV Vaccine (Age 60+ and Pregnant patients) (1 - 1-dose 75+ series) 06/18/2074    Hepatitis C Screening  Completed    HIV Screening  Completed    Pneumococcal Vaccines (Age 0-49)  Aged Out     Future Appointments   Date Time Provider Department Center   1/6/2025  1:45 PM Patricio Leslie MD [facility]   1/9/2025 10:30 AM [place] [address] OB [facility]den [facility]arreal   1/9/2025 11:00 AM INITIAL OB, [facility] RESIDENTS [facility]       RTC in prn    Ricardo Lizama MD  [facility], PGY-2

## 2025-01-06 ENCOUNTER — TELEPHONE (OUTPATIENT)
Dept: MATERNAL FETAL MEDICINE | Facility: CLINIC | Age: 25
End: 2025-01-06
Payer: MEDICAID

## 2025-01-07 DIAGNOSIS — O20.8 SUBCHORIONIC HEMORRHAGE IN FIRST TRIMESTER: Primary | ICD-10-CM

## 2025-01-08 PROBLEM — O09.291 PRIOR PREGNANCY WITH FETAL DEMISE AND CURRENT PREGNANCY IN FIRST TRIMESTER: Status: ACTIVE | Noted: 2025-01-08

## 2025-01-08 PROBLEM — O20.8 SUBCHORIONIC HEMORRHAGE OF PLACENTA IN FIRST TRIMESTER: Status: ACTIVE | Noted: 2025-01-08

## 2025-01-08 NOTE — PROGRESS NOTES
Maternal Fetal Medicine New Consult    Subjective:     Patient ID: [id number]    Chief Complaint: MFM consult with US (Subchorionic Hemorrhage, h/o Fetal Demise at 38 weeks.)      HPI: 24 y.o.  female  at 13w4d gestation with Estimated Date of Delivery: 25 by early US and unknown LMP. She is sent for MFM consultation for subchorionic hemorrhage, history of fetal demise.     She has history of fetal demise in utero at 38 weeks 1 day in her 2nd pregnancy.  She was induced and delivered vaginally.  Infant weighed 7 lb, noted to have tight nuchal cord at birth.  She recently had a miscarriage in 2024 at 7 weeks gestation and had a D&C.  She had APLS testing on 2024 that showed negative anti beta 2 glycoprotein antibodies, negative antiphospholipid antibodies, no lupus anticoagulant available.  She was noted to have a subchorionic hemorrhage on recent ultrasound with primary OB.  She reports ongoing spotting since mid December, last this morning which was a very small pink spot on her pad.  Patient's mother, Saniya, present for visit.       She denies any personal or family history of aneuploidy or anomalies. She denies any exposure to high fevers, viral rashes, illicit drugs or alcohol in this pregnancy.  She denies any leaking fluid, vaginal bleeding, contractions, decreased fetal movement. Denies headaches, visual disturbances, or epigastric pain.       Pregnancy complications include:  Patient Active Problem List   Diagnosis    Prior pregnancy with fetal demise and current pregnancy in first trimester    Subchorionic hemorrhage of placenta in first trimester    Spotting affecting pregnancy in first trimester       Past Medical History:   Diagnosis Date    Missed  10/2024    Prior pregnancy with fetal demise 2023    at 38 weeks GA       Past Surgical History:   Procedure Laterality Date    DILATION AND CURETTAGE OF UTERUS  10/2024    missed incomplete        Family History    Problem Relation Name Age of Onset    No Known Problems Paternal Grandfather      Breast cancer Paternal Grandmother      No Known Problems Maternal Grandmother      No Known Problems Maternal Grandfather      No Known Problems Father      No Known Problems Mother         Social History     Socioeconomic History    Marital status: Single   Tobacco Use    Smoking status: Never     Passive exposure: Never    Smokeless tobacco: Never   Substance and Sexual Activity    Alcohol use: Not Currently    Drug use: Never    Sexual activity: Yes     Partners: Male     Social Drivers of Health     Financial Resource Strain: Low Risk  (3/25/2024)    Overall Financial Resource Strain (CARDIA)     Difficulty of Paying Living Expenses: Not hard at all   Food Insecurity: No Food Insecurity (3/25/2024)    Hunger Vital Sign     Worried About Running Out of Food in the Last Year: Never true     Ran Out of Food in the Last Year: Never true   Transportation Needs: No Transportation Needs (3/25/2024)    PRAPARE - Transportation     Lack of Transportation (Medical): No     Lack of Transportation (Non-Medical): No   Physical Activity: Insufficiently Active (3/25/2024)    Exercise Vital Sign     Days of Exercise per Week: 2 days     Minutes of Exercise per Session: 30 min   Stress: No Stress Concern Present (3/25/2024)    [employer] - Occupational Stress Questionnaire     Feeling of Stress : Only a little   Housing Stability: Unknown (3/25/2024)    Housing Stability Vital Sign     Unable to Pay for Housing in the Last Year: No     Unstable Housing in the Last Year: No       Current Outpatient Medications   Medication Sig Dispense Refill    PNV,calcium 72-iron,carb-folic (PRENATAL PLUS) 29 mg iron- 1 mg Tab Take 1 tablet by mouth once daily. 30 tablet 11    PNV,calcium 72/iron/folic acid (PRENATAL VITAMIN) Tab Take 1 tablet by mouth once daily. (Patient not taking: Reported on 1/9/2025) 30 tablet 11     No  "current facility-administered medications for this visit.       Review of patient's allergies indicates:  No Known Allergies     Review of Systems   12 point review of systems conducted, negative except as stated in the history of present illness. See HPI for details.      Objective:     Visit Vitals  /84 (BP Location: Left arm, Patient Position: Sitting)   Pulse 98   Ht 5' 5" (1.651 m)   Wt 66.2 kg (146 lb)   LMP 10/20/2024   BMI 24.30 kg/m²        Physical Exam  Vitals and nursing note reviewed.   Constitutional:       General: She is not in acute distress.     Appearance: Normal appearance.   HENT:      Head: Normocephalic and atraumatic.      Nose: Nose normal. No congestion.      Mouth/Throat:      Pharynx: Oropharynx is clear.   Eyes:      General: No scleral icterus.     Conjunctiva/sclera: Conjunctivae normal.   Cardiovascular:      Rate and Rhythm: Normal rate and regular rhythm.   Pulmonary:      Effort: No respiratory distress.      Breath sounds: Normal breath sounds. No wheezing.   Abdominal:      General: Abdomen is flat.      Palpations: Abdomen is soft.      Tenderness: There is no abdominal tenderness. There is no right CVA tenderness, left CVA tenderness or guarding.      Comments: No CVA tenderness gravid uterus.    Musculoskeletal:         General: Normal range of motion.      Cervical back: Neck supple.      Right lower leg: No edema.      Left lower leg: No edema.   Skin:     General: Skin is warm.      Findings: No bruising or rash.   Neurological:      General: No focal deficit present.      Mental Status: She is oriented to person, place, and time.      Deep Tendon Reflexes: Reflexes normal.      Comments: Normal reflexes   Psychiatric:         Mood and Affect: Mood normal.         Behavior: Behavior normal.         Thought Content: Thought content normal.         Judgment: Judgment normal.         Assessment/Plan:     24 y.o.  female with IUP at 13w4d    History of FDIU at 38 " weeks in previous pregnancy  Viable IUP with EDC 07/13/2025 on ultrasound today, 01/09/2025.      Discussed the cause of fetal death is complex as there are many contributing and interacting factors. In addition, certain conditions may be associated with stillbirths without directly causing them--for example, autoimmune diseases and chronic conditions. Thus, for many stillbirths it is difficult to determine the exact cause, and even after extensive evaluation many stillbirths remain unexplained. I discussed with her risk for recurrence with no predictability or prevention available, except for close monitoring with expectant management that is not 100% protective against adverse outcomes. With possible association with Antiphospholipid antibody syndrome, offered and accepted APS testing. The initial diagnosis of APS requires testing for anticardiolipin antibodies and the lupus anticoagulant. A lupus anticoagulant is interpreted as present or absent. Anticardiolipin antibodies must be greater than the 99th percentile to be considered clinically significant. Positive results require a repeat test after 12 weeks to exclude a transient, clinically unimportant false positive antibody test. If confirmed positive, would need treatment with baby aspirin and low-molecular weight heparin.    She had negative anti-beta 2 glycoprotein antibodies.  Orders were given today for lupus anticoagulant and anticardiolipin antibodies (out of caution as nonspecific antiphospholipid antibody test was done instead).     I suggest monitoring fetal growth every four weeks after level 2 scan with fetal testing to be done twice weekly after 32 weeks and continued until delivery.    Discussed lack of consensus on optimal time of delivery with hx of fetal demise. Different opinions about optimal timing of delivery exist, in view of lack of data to support a specific approach, with consideration for delivery at 38-39 weeks,. Risks/benefits of  each option, including prematurity with 38 weeks delivery, vs risk of abruption, demise if waiting till 39 weeks, were discussed and patient more comfortable with delivery at 38 weeks, knowing benefits and risks of both options. She will have delivery scheduled by 38 weeks.    She is to report any significant cramping or any vaginal bleeding. She is also to do fetal kick counts 3x/daily with immediate reporting of decreased fetal movement.       Subchorionic hemorrhage with vaginal spotting  She was advised that most patients with this do well in pregnancy. However, there is higher association of bleeding in later pregnancy as well as pregnancy loss, PPROM,  labor and delivery, especially in patient with bleeding episodes rather than asymptomatic subchorionic hemorrhage.  There is no prevention available and advised of need for pelvic rest x 2 weeks from last bleeding episode with expectant management will be done and advised to report any bleeding or increased cramps mainly in the second half of the pregnancy.       Preeclampsia prophylaxis  With her risk factors for preeclampsia including   ancestry, low socioeconomic status, previous low birthweight or SGA baby, and previous pregnancy with poor obstetric history, discussed recommendations for low dose aspirin use to decrease risks for adverse outcomes, including preeclampsia, low birth weight and  delivery. Low-dose aspirin reduced the risk for preeclampsia by 15% in clinical trials and reduced the risk for  birth by 20% and FGR by 20%, and  mortality by around 20%.  After discussing risks/benefits of its use, with subchorionic hemorrhage and spotting, she will hold off on low-dose aspirin at this time. May consider to start around 15 to 16 weeks, if no bleeding for a couple of weeks.  Preeclampsia precautions given.    Patient was counseled of the management of history of fetal demise in utero.  We are ordering some missing  testing from antiphospholipid antibody syndrome.  With intermittent bleeding we will hold off on the aspirin at this time but resume least 2 weeks after the last bleeding episode.  Reviewed images from earlier ultrasound and today's images recommended using ultrasound assigned EDC of July 13, 2025.  Discussed with Dr. Leslie.    Follow up in about 6 weeks (around 2/20/2025) for MFM Followup, Level 2 Ultrasound.     Future Appointments   Date Time Provider Department Center   2/3/2025 10:15 AM Patricio Leslie MD [facility]        Patient was evaluated by MARQUEZ Hyde, and and Dr. Little.  Final assessment and recommendations as stated above were made by Dr. Little.    This note was created with the assistance of [employer] voice recognition software. There may be transcription errors as a result of using this technology, however minimal. Effort has been made to ensure accuracy of transcription, but any obvious errors or omissions should be clarified with the author of the document.

## 2025-01-09 ENCOUNTER — PROCEDURE VISIT (OUTPATIENT)
Dept: MATERNAL FETAL MEDICINE | Facility: CLINIC | Age: 25
End: 2025-01-09
Payer: MEDICAID

## 2025-01-09 ENCOUNTER — OFFICE VISIT (OUTPATIENT)
Dept: MATERNAL FETAL MEDICINE | Facility: CLINIC | Age: 25
End: 2025-01-09
Payer: MEDICAID

## 2025-01-09 VITALS
BODY MASS INDEX: 24.32 KG/M2 | DIASTOLIC BLOOD PRESSURE: 84 MMHG | SYSTOLIC BLOOD PRESSURE: 103 MMHG | HEIGHT: 65 IN | HEART RATE: 98 BPM | WEIGHT: 146 LBS

## 2025-01-09 DIAGNOSIS — O20.8 SUBCHORIONIC HEMORRHAGE IN FIRST TRIMESTER: ICD-10-CM

## 2025-01-09 DIAGNOSIS — O26.851 SPOTTING AFFECTING PREGNANCY IN FIRST TRIMESTER: ICD-10-CM

## 2025-01-09 DIAGNOSIS — O20.8 SUBCHORIONIC HEMORRHAGE OF PLACENTA IN FIRST TRIMESTER: ICD-10-CM

## 2025-01-09 DIAGNOSIS — O09.291 PRIOR PREGNANCY WITH FETAL DEMISE AND CURRENT PREGNANCY IN FIRST TRIMESTER: Primary | ICD-10-CM

## 2025-01-09 PROCEDURE — 3008F BODY MASS INDEX DOCD: CPT | Mod: CPTII,S$GLB,, | Performed by: OBSTETRICS & GYNECOLOGY

## 2025-01-09 PROCEDURE — 3074F SYST BP LT 130 MM HG: CPT | Mod: CPTII,S$GLB,, | Performed by: OBSTETRICS & GYNECOLOGY

## 2025-01-09 PROCEDURE — 3079F DIAST BP 80-89 MM HG: CPT | Mod: CPTII,S$GLB,, | Performed by: OBSTETRICS & GYNECOLOGY

## 2025-01-09 PROCEDURE — 76801 OB US < 14 WKS SINGLE FETUS: CPT | Mod: S$GLB,,, | Performed by: OBSTETRICS & GYNECOLOGY

## 2025-01-09 PROCEDURE — 1159F MED LIST DOCD IN RCRD: CPT | Mod: CPTII,S$GLB,, | Performed by: OBSTETRICS & GYNECOLOGY

## 2025-01-09 PROCEDURE — 99203 OFFICE O/P NEW LOW 30 MIN: CPT | Mod: TH,S$GLB,, | Performed by: OBSTETRICS & GYNECOLOGY

## 2025-01-09 PROCEDURE — 1160F RVW MEDS BY RX/DR IN RCRD: CPT | Mod: CPTII,S$GLB,, | Performed by: OBSTETRICS & GYNECOLOGY

## 2025-01-16 ENCOUNTER — LAB VISIT (OUTPATIENT)
Dept: LAB | Facility: HOSPITAL | Age: 25
End: 2025-01-16
Attending: OBSTETRICS & GYNECOLOGY
Payer: MEDICAID

## 2025-01-16 DIAGNOSIS — Z03.89 ENCNTR FOR OBS FOR OTH SUSPECTED DISEASES AND COND RULED OUT: ICD-10-CM

## 2025-01-16 DIAGNOSIS — O09.291 PRIOR PREGNANCY WITH FETAL DEMISE AND CURRENT PREGNANCY IN FIRST TRIMESTER: ICD-10-CM

## 2025-01-16 DIAGNOSIS — Z34.81 PRENATAL CARE, SUBSEQUENT PREGNANCY, FIRST TRIMESTER: ICD-10-CM

## 2025-01-16 LAB
BASOPHILS # BLD AUTO: 0.02 X10(3)/MCL
BASOPHILS NFR BLD AUTO: 0.3 %
EOSINOPHIL # BLD AUTO: 0.04 X10(3)/MCL (ref 0–0.9)
EOSINOPHIL NFR BLD AUTO: 0.6 %
ERYTHROCYTE [DISTWIDTH] IN BLOOD BY AUTOMATED COUNT: 12.7 % (ref 11.5–17)
GROUP & RH: NORMAL
HBV SURFACE AG SERPL QL IA: NONREACTIVE
HCT VFR BLD AUTO: 36.2 % (ref 37–47)
HCV AB SERPL QL IA: NONREACTIVE
HGB BLD-MCNC: 11.9 G/DL (ref 12–16)
HIV 1+2 AB+HIV1 P24 AG SERPL QL IA: NONREACTIVE
IMM GRANULOCYTES # BLD AUTO: 0.02 X10(3)/MCL (ref 0–0.04)
IMM GRANULOCYTES NFR BLD AUTO: 0.3 %
INDIRECT COOMBS: NORMAL
LYMPHOCYTES # BLD AUTO: 1.08 X10(3)/MCL (ref 0.6–4.6)
LYMPHOCYTES NFR BLD AUTO: 16.6 %
MCH RBC QN AUTO: 26.3 PG (ref 27–31)
MCHC RBC AUTO-ENTMCNC: 32.9 G/DL (ref 33–36)
MCV RBC AUTO: 79.9 FL (ref 80–94)
MONOCYTES # BLD AUTO: 0.46 X10(3)/MCL (ref 0.1–1.3)
MONOCYTES NFR BLD AUTO: 7.1 %
NEUTROPHILS # BLD AUTO: 4.89 X10(3)/MCL (ref 2.1–9.2)
NEUTROPHILS NFR BLD AUTO: 75.1 %
NRBC BLD AUTO-RTO: 0 %
PLATELET # BLD AUTO: 184 X10(3)/MCL (ref 130–400)
PMV BLD AUTO: 10.5 FL (ref 7.4–10.4)
RBC # BLD AUTO: 4.53 X10(6)/MCL (ref 4.2–5.4)
SPECIMEN OUTDATE: NORMAL
T PALLIDUM AB SER QL: NONREACTIVE
T4 FREE SERPL-MCNC: 0.94 NG/DL (ref 0.7–1.48)
TSH SERPL-ACNC: 0.83 UIU/ML (ref 0.35–4.94)
TT IMM BOVINE THROMBIN PPP: 16 SECONDS (ref 0–22)
WBC # BLD AUTO: 6.51 X10(3)/MCL (ref 4.5–11.5)

## 2025-01-16 PROCEDURE — 86850 RBC ANTIBODY SCREEN: CPT | Performed by: OBSTETRICS & GYNECOLOGY

## 2025-01-16 PROCEDURE — 86762 RUBELLA ANTIBODY: CPT

## 2025-01-16 PROCEDURE — 84439 ASSAY OF FREE THYROXINE: CPT

## 2025-01-16 PROCEDURE — 85670 THROMBIN TIME PLASMA: CPT

## 2025-01-16 PROCEDURE — 87389 HIV-1 AG W/HIV-1&-2 AB AG IA: CPT

## 2025-01-16 PROCEDURE — 85025 COMPLETE CBC W/AUTO DIFF WBC: CPT

## 2025-01-16 PROCEDURE — 86803 HEPATITIS C AB TEST: CPT

## 2025-01-16 PROCEDURE — 86787 VARICELLA-ZOSTER ANTIBODY: CPT

## 2025-01-16 PROCEDURE — 83021 HEMOGLOBIN CHROMOTOGRAPHY: CPT

## 2025-01-16 PROCEDURE — 84443 ASSAY THYROID STIM HORMONE: CPT

## 2025-01-16 PROCEDURE — 87340 HEPATITIS B SURFACE AG IA: CPT

## 2025-01-16 PROCEDURE — 86147 CARDIOLIPIN ANTIBODY EA IG: CPT

## 2025-01-16 PROCEDURE — 85613 RUSSELL VIPER VENOM DILUTED: CPT

## 2025-01-16 PROCEDURE — 86780 TREPONEMA PALLIDUM: CPT

## 2025-01-16 PROCEDURE — 36415 COLL VENOUS BLD VENIPUNCTURE: CPT

## 2025-01-17 LAB
CARDIOLIPIN IGG QUANT (OHS): 3 GPL U/ML
CARDIOLIPIN IGM QUANT (OHS): 5.2 MPL U/ML
DRVV CONF RATIO (OHS): 0.88
DRVV NORM RATIO (OHS): 1.11 (ref 0–1.19)
DRVV SCR RATIO (OHS): 0.98
L.A. PATH INTERP (OHS): NORMAL
LA ST CALC (OHS): 2 SECONDS (ref 0–7.9)

## 2025-01-18 LAB
RUBV IGG SERPL IA-ACNC: 1.1
RUBV IGG SERPL QL IA: POSITIVE
VZV IGG SER IA-ACNC: 0.8
VZV IGG SER QL IA: NEGATIVE

## 2025-01-20 LAB
HGB A MFR BLD ELPH: 97.2 % (ref 95.8–98)
HGB A2 MFR BLD ELPH: 2.8 % (ref 2–3.3)
HGB F MFR BLD ELPH: 0 % (ref 0–0.9)
HGB FRACT BLD ELPH-IMP: NORMAL
M HPLC HB VARIANT, B: NORMAL

## 2025-02-03 ENCOUNTER — LAB VISIT (OUTPATIENT)
Dept: LAB | Facility: HOSPITAL | Age: 25
End: 2025-02-03
Attending: INTERNAL MEDICINE
Payer: MEDICAID

## 2025-02-03 DIAGNOSIS — Z34.82 SUPERVISION OF NORMAL INTRAUTERINE PREGNANCY IN MULTIGRAVIDA, SECOND TRIMESTER: ICD-10-CM

## 2025-02-03 PROCEDURE — 36415 COLL VENOUS BLD VENIPUNCTURE: CPT

## 2025-02-03 PROCEDURE — 81511 FTL CGEN ABNOR FOUR ANAL: CPT

## 2025-02-04 LAB
# FETUSES: 1
2ND TRIMESTER 4 SCREEN SERPL-IMP: NORMAL
AFP ADJ MOM SERPL: 0.87 MOM
AFP SERPL IA-MCNC: 36.9 NG/ML
AGE AT DELIVERY: NORMAL
B-HCG ADJ MOM SERPL: 0.3 MOM
CHORION TYPE: NORMAL
COLLECT DATE: NORMAL
CURRENT SMOKER: NORMAL
FET TS 21 RISK FROM MAT AGE: NORMAL
GA METHOD: NORMAL
GA US.COMPOSITE.EST: NORMAL WK,D
HCG SERPL IA-ACNC: 9.7 IU/ML
HX OF NTD QL: NO
HX OF NTD QL: NO
HX OF TRISOMY 21 QL: NO
IDDM PATIENT QL: NO
INHIBIN A ADJ MOM SERPL: 0.96 MOM
INHIBIN SERPL-MCNC: 143 PG/ML
IVF PREGNANCY: NO
LABORATORY COMMENT REPORT: NORMAL
M PHYSICIAN PHONE NUMBER: NORMAL
MATERNAL RISK FACTORS: NORMAL
NEURAL TUBE DEFECT RISK FETUS: NORMAL %
RECOM F/U: NORMAL
TEST PERFORMANCE INFO SPEC: NORMAL
TS 18 RISK FETUS: NORMAL
TS 21 RISK FETUS: NORMAL
U ESTRIOL ADJ MOM SERPL: 1.06 MOM
U ESTRIOL SERPL-MCNC: 1.4 NG/ML

## 2025-02-05 DIAGNOSIS — O09.292 PRIOR PREGNANCY WITH FETAL DEMISE AND CURRENT PREGNANCY IN SECOND TRIMESTER: ICD-10-CM

## 2025-02-05 DIAGNOSIS — Z36.89 ENCOUNTER FOR FETAL ANATOMIC SURVEY: Primary | ICD-10-CM

## 2025-02-17 PROBLEM — O26.852 SPOTTING AFFECTING PREGNANCY IN SECOND TRIMESTER: Status: ACTIVE | Noted: 2025-01-09

## 2025-02-17 PROBLEM — O46.8X2 SUBCHORIONIC HEMORRHAGE OF PLACENTA IN SECOND TRIMESTER: Status: ACTIVE | Noted: 2025-01-08

## 2025-02-17 PROBLEM — O09.292 PRIOR PREGNANCY WITH FETAL DEMISE AND CURRENT PREGNANCY IN SECOND TRIMESTER: Status: ACTIVE | Noted: 2025-01-08

## 2025-02-17 NOTE — PROGRESS NOTES
"  Maternal Fetal Medicine Follow Up    Subjective:     Patient ID: [id number]    Chief Complaint: mfm/followup/ulrasound      HPI: Shani Stephens is a 24 y.o. female  at 19w4d gestation with Estimated Date of Delivery: 25  who is here for follow-up consultation by [facility].    She has history of fetal demise in utero at 38 weeks 1 day in her 2nd pregnancy.  She was induced and delivered vaginally.  Infant weighed 7 lb, noted to have tight nuchal cord at birth.  She recently had a miscarriage in 2024 at 7 weeks gestation and had a D&C.  She had negative APS testing this pregnancy.  She was noted to have a subchorionic hemorrhage on recent ultrasound.  She reported ongoing spotting since mid December, last over 1 month ago around mid January she has not started daily aspirin yet..       Interval history since last [facility] visit: None.. She denies any leaking fluid, vaginal bleeding, contractions, decreased fetal movement. Denies headaches, visual disturbances, or epigastric pain.    Pregnancy complications include:   Problem List[1]    No changes to medical, surgical, family, social, or obstetric history.    Medications:  Current Outpatient Medications   Medication Instructions    aspirin (ECOTRIN) 81 mg, Oral, Daily    PNV,calcium 72-iron,carb-folic (PRENATAL PLUS) 29 mg iron- 1 mg Tab 1 tablet, Oral, Daily       Review of Systems   12 point review of systems conducted, negative except as stated in the history of present illness. See HPI for details.      Objective:     Visit Vitals  /68 (BP Location: Left arm, Patient Position: Sitting)   Pulse 98   Ht 5' 6" (1.676 m)   Wt 69.4 kg (153 lb)   LMP 10/20/2024   SpO2 99%   BMI 24.69 kg/m²        Physical Exam  Vitals and nursing note reviewed.   Constitutional:       Appearance: Normal appearance.   HENT:      Head: Normocephalic and atraumatic.      Nose: Nose normal. No congestion.   Cardiovascular:      Rate and Rhythm: Normal rate.   Pulmonary: "      Effort: Pulmonary effort is normal.   Skin:     Findings: No rash.   Neurological:      Mental Status: She is alert and oriented to person, place, and time.   Psychiatric:         Mood and Affect: Mood normal.         Behavior: Behavior normal.         Thought Content: Thought content normal.         Judgment: Judgment normal.         Assessment/Plan:     24 y.o.  female with IUP at 19w4d    History of FDIU at 38 weeks in previous pregnancy  There is normal fetal growth and no anomalies seen on fetal anatomy scan on 25.  AFV is normal.    Reviewed with her risk for recurrence with no predictability or prevention available, except for close monitoring with expectant management that is not 100% protective against adverse outcomes. With possible association with Antiphospholipid antibody syndrome, she previously had negative APS testing..     I suggest monitoring fetal growth every four weeks with fetal testing to be done twice weekly after 32 weeks and continued until delivery. She is to do fetal kick counts 3x/daily and PRN after 24 weeks with immediate reporting of decreased fetal movements (<10 movements/hr).     Previously discussed lack of consensus on optimal time of delivery with hx of fetal demise. Different opinions about optimal timing of delivery exist, in view of lack of data to support a specific approach, with consideration for delivery at 38-39 weeks,. Risks/benefits of each option, including prematurity with 38 weeks delivery, vs risk of abruption, demise if waiting until 39 weeks, were discussed and patient more comfortable with delivery at 38 weeks, knowing benefits and risks of both options.    Reviewed instructions to report any significant cramping or any vaginal bleeding. She is also to do fetal kick counts 3x/daily with immediate reporting of decreased fetal movement.       Subchorionic hemorrhage with previous vaginal spotting that has resolved with the last bleeding around mid  January  There is no prevention available and reviewed need for pelvic rest x 2 weeks from any bleeding episode with expectant management to be done.  Reviewed instructions to report any bleeding or increased cramps mainly in the second half of the pregnancy.       Preeclampsia prophylaxis  With no bleeding over a month and no subchorionic hemorrhage, patient was advised to start taking aspirin 81 mg daily to be continued till delivery.   .  Preeclampsia precautions reviewed.     Follow up in about 8 weeks (around 4/17/2025) for MFM Followup, Repeat Ultrasound.     Future Appointments   Date Time Provider Department Center   3/5/2025  9:30 AM Patricio Leslie MD [facility]   4/17/2025  9:00 AM Choco Little MD [facility]   4/17/2025  9:00 AM ROOM 2, [facility]        GEREMIAS involvement: Patient was evaluated and examined by Dr. Little. KINZA Doty, helped in pre charting of part of note.    This note was created with the assistance of [employer] voice recognition software. There may be transcription errors as a result of using this technology, however minimal. Effort has been made to ensure accuracy of transcription, but any obvious errors or omissions should be clarified with the author of the document.           [1]   Patient Active Problem List  Diagnosis    Prior pregnancy with fetal demise and current pregnancy in second trimester    Suspected fetal anomaly, antepartum

## 2025-02-20 ENCOUNTER — PROCEDURE VISIT (OUTPATIENT)
Dept: MATERNAL FETAL MEDICINE | Facility: CLINIC | Age: 25
End: 2025-02-20
Payer: MEDICAID

## 2025-02-20 ENCOUNTER — OFFICE VISIT (OUTPATIENT)
Dept: MATERNAL FETAL MEDICINE | Facility: CLINIC | Age: 25
End: 2025-02-20
Payer: MEDICAID

## 2025-02-20 VITALS
OXYGEN SATURATION: 99 % | DIASTOLIC BLOOD PRESSURE: 68 MMHG | HEART RATE: 98 BPM | BODY MASS INDEX: 24.59 KG/M2 | HEIGHT: 66 IN | WEIGHT: 153 LBS | SYSTOLIC BLOOD PRESSURE: 116 MMHG

## 2025-02-20 DIAGNOSIS — O35.9XX0 SUSPECTED FETAL ANOMALY, ANTEPARTUM, SINGLE OR UNSPECIFIED FETUS: ICD-10-CM

## 2025-02-20 DIAGNOSIS — Z36.89 ENCOUNTER FOR FETAL ANATOMIC SURVEY: ICD-10-CM

## 2025-02-20 DIAGNOSIS — O09.292 PRIOR PREGNANCY WITH FETAL DEMISE AND CURRENT PREGNANCY IN SECOND TRIMESTER: ICD-10-CM

## 2025-02-20 DIAGNOSIS — O26.852 SPOTTING AFFECTING PREGNANCY IN SECOND TRIMESTER: ICD-10-CM

## 2025-02-20 DIAGNOSIS — O46.8X2 SUBCHORIONIC HEMORRHAGE OF PLACENTA IN SECOND TRIMESTER: Primary | ICD-10-CM

## 2025-02-20 RX ORDER — ASPIRIN 81 MG/1
81 TABLET ORAL DAILY
Qty: 30 TABLET | Refills: 11 | Status: SHIPPED | OUTPATIENT
Start: 2025-02-20 | End: 2026-02-20

## 2025-03-03 ENCOUNTER — HOSPITAL ENCOUNTER (EMERGENCY)
Facility: HOSPITAL | Age: 25
Discharge: HOME | End: 2025-03-03
Attending: OBSTETRICS & GYNECOLOGY
Payer: MEDICAID

## 2025-03-03 VITALS
HEIGHT: 66 IN | BODY MASS INDEX: 25.39 KG/M2 | HEART RATE: 74 BPM | RESPIRATION RATE: 16 BRPM | WEIGHT: 158 LBS | SYSTOLIC BLOOD PRESSURE: 117 MMHG | TEMPERATURE: 98 F | OXYGEN SATURATION: 99 % | DIASTOLIC BLOOD PRESSURE: 65 MMHG

## 2025-03-03 PROCEDURE — 99284 EMERGENCY DEPT VISIT MOD MDM: CPT

## 2025-03-03 RX ORDER — ONDANSETRON HYDROCHLORIDE 2 MG/ML
4 INJECTION, SOLUTION INTRAVENOUS
Status: DISCONTINUED | OUTPATIENT
Start: 2025-03-03 | End: 2025-03-03 | Stop reason: HOSPADM

## 2025-03-03 NOTE — ED PROVIDER NOTES
"[facility] NOTE     Admit Date: 3/3/2025  [facility] Physician: Carl Zuniga  Primary OBGYN: Dr. Patricio Leslie    Admit Diagnosis/Chief Complaint: Nausea and Vomiting      Chief Complaint   Patient presents with    Nausea     Pt reports nausea beginning 3/2 pm w/ vomiting x3 since. Pt denies being able to tolerate food or drink at this time. Patient denies vag bleeding or leaking fluid from vagina. Pt reports intermitting abdominal cramping today.        Hospital Course:  Shani Stephens is a 24 y.o.  at 21w1d presents complaining of increased nausea and vomiting.  Patient states she has had chronic nausea throughout the pregnancy.  She denies any sick contacts but has had several fatty and greasy meals.      Patient states has been complicated by subchorionic hemorrhage and a history of IUFD followed by Maternal-Fetal Medicine in this pregnancy.     Patient denies vaginal bleeding, leakage of fluid, and contractions.  Fetal Movement: normal.    /65   Pulse 74   Temp 97.9 °F (36.6 °C)   Resp 16   Ht 5' 6" (1.676 m)   Wt 71.7 kg (158 lb)   LMP 10/20/2024 Comment: Miscarriage at 7 week, DNC on 10/1/2024, no menstrual cycle since miscariage and DNC  SpO2 99%   BMI 25.50 kg/m²   Temp:  [97.9 °F (36.6 °C)] 97.9 °F (36.6 °C)  Pulse:  [61-77] 74  Resp:  [16] 16  SpO2:  [99 %-100 %] 99 %  BP: (117)/(62-65) 117/65    General: in no apparent distress  Abdominal: soft, nontender, nondistended, no abnormal masses, no epigastric pain FHT present negative Sadler's  Back: lumbar tenderness absent   CVA tenderness none  Extremeties no redness or tenderness in the calves or thighs no edema    SSE:   SVE:      FHT:   150s  TOCO: Contractions irritability      LABS:   No results found for this or any previous visit (from the past 24 hours).  [unfilled]     Imaging Results    None          ASSESMENT: Shani Stephens is a 24 y.o.   at 21w1d with nausea vomiting in pregnancy  Observation in [facility]  IV fluids and " antiemetics  Discussed over-the-counter treatment for GERD  Labor precautions reviewed with patient  ER precautions reviewed with patient  Patient was given an opportunity to ask questions  Patient is to follow-up with her primary care physician        Discharge Diagnosis/Clinical Impression**:  Nausea vomiting pregnancy  Status:Stable    Patient Instructions:       - Pt was given routine pregnancy instructions including to return to triage if she had any vaginal bleeding (other than spotting for the next 48hrs), any loss of fluid like her water broke, decreased fetal movement, or contractions Q 5min lasting for 2 or more hours. Pt was also instructed to drink copious water. Patient voiced understanding of all these instructions and was subsequently discharged home.    She will follow up with her primary OB.      This note was created with the assistance of [employer] voice recognition software. There may be transcription errors as a result of using this technology however minimal. Effort has been made to assure accuracy of transcription but any obvious errors or omissions should be clarified with the author of the document.

## 2025-03-31 DIAGNOSIS — Z36.89 ENCOUNTER FOR ULTRASOUND TO ASSESS FETAL GROWTH: ICD-10-CM

## 2025-03-31 DIAGNOSIS — O09.292 PRIOR PREGNANCY WITH FETAL DEMISE AND CURRENT PREGNANCY IN SECOND TRIMESTER: Primary | ICD-10-CM

## 2025-04-10 ENCOUNTER — LAB VISIT (OUTPATIENT)
Dept: LAB | Facility: HOSPITAL | Age: 25
End: 2025-04-10
Attending: OBSTETRICS & GYNECOLOGY
Payer: MEDICAID

## 2025-04-10 DIAGNOSIS — O09.92 ENCOUNTER FOR SUPERVISION OF HIGH RISK PREGNANCY IN SECOND TRIMESTER, ANTEPARTUM: ICD-10-CM

## 2025-04-10 LAB — GLUCOSE 1H P 100 G GLC PO SERPL-MCNC: 103 MG/DL (ref 100–180)

## 2025-04-10 PROCEDURE — 82950 GLUCOSE TEST: CPT

## 2025-04-16 PROBLEM — O46.8X2 SUBCHORIONIC HEMORRHAGE IN SECOND TRIMESTER: Status: ACTIVE | Noted: 2025-04-16

## 2025-04-16 NOTE — PROGRESS NOTES
"  Maternal Fetal Medicine Follow Up    Subjective:     Patient ID: [id number]    Chief Complaint: [facility] follow up with US      HPI: Shani Stephens is a 24 y.o. female  at 27w4d gestation with Estimated Date of Delivery: 25  who is here for follow-up consultation by M.    She has history of fetal demise in utero at 38 weeks 1 day in her 2nd pregnancy.  She was induced and delivered vaginally.  Infant weighed 7 lb, noted to have tight nuchal cord at birth.  She recently had a miscarriage in 2024 at 7 weeks gestation and had a D&C.  She had negative APS testing this pregnancy.  She is on low-dose aspirin once daily for preeclampsia prophylaxis.       Interval history since last [facility] visit: None.. She denies any leaking fluid, vaginal bleeding, contractions, decreased fetal movement. Denies headaches, visual disturbances, or epigastric pain.    Pregnancy complications include:   Problem List[1]    No changes to medical, surgical, family, social, or obstetric history.    Medications:  Current Outpatient Medications   Medication Instructions    aspirin (ECOTRIN) 81 mg, Oral, Daily    clotrimazole (LOTRIMIN) 1 % Crea 1 applicator, Vaginal, Nightly    PNV,calcium 72-iron,carb-folic (PRENATAL PLUS) 29 mg iron- 1 mg Tab 1 tablet, Oral, Daily       Review of Systems   12 point review of systems conducted, negative except as stated in the history of present illness. See HPI for details.      Objective:     Visit Vitals  /66 (BP Location: Left arm, Patient Position: Sitting)   Pulse 81   Ht 5' 6" (1.676 m)   Wt 73 kg (161 lb)   LMP 10/20/2024   BMI 25.99 kg/m²        Physical Exam  Vitals and nursing note reviewed.   Constitutional:       Appearance: Normal appearance.   HENT:      Head: Normocephalic and atraumatic.   Cardiovascular:      Rate and Rhythm: Normal rate and regular rhythm.   Pulmonary:      Effort: Pulmonary effort is normal. No respiratory distress.      Breath sounds: Normal breath " sounds.   Abdominal:      Palpations: Abdomen is soft.      Tenderness: There is no abdominal tenderness.   Musculoskeletal:      Right lower leg: No edema.      Left lower leg: No edema.   Skin:     General: Skin is warm and dry.   Neurological:      Mental Status: She is alert and oriented to person, place, and time.   Psychiatric:         Mood and Affect: Mood normal.         Behavior: Behavior normal.         Thought Content: Thought content normal.         Judgment: Judgment normal.         Assessment/Plan:     24 y.o.  female with IUP at 27w4d    History of FDIU at 38 weeks in previous pregnancy  There is normal fetal growth with an EFW of 1194 g at the 55% and the AC at the 48% on 25  AFV is normal.     Reviewed with her risk for recurrence with no predictability or prevention available, except for close monitoring with expectant management that is not 100% protective against adverse outcomes. With possible association with Antiphospholipid antibody syndrome, she previously had negative APS testing.    Recommend monitoring fetal growth every four weeks with fetal testing to be done twice weekly after 32 weeks and continued until delivery.     Previously discussed lack of consensus on optimal time of delivery with hx of fetal demise. Different opinions about optimal timing of delivery exist, in view of lack of data to support a specific approach, with consideration for delivery at 38-39 weeks,. Risks/benefits of each option, including prematurity with 38 weeks delivery, vs risk of abruption, demise if waiting until 39 weeks, were discussed and patient more comfortable with delivery at 38 weeks, knowing benefits and risks of both options. She will discuss further with Primary OB.    Reviewed instructions to report any significant cramping or any vaginal bleeding. Reviewed fetal kick count instructions.    Preeclampsia prophylaxis  With her risk factors for preeclampsia, she will continue asa 81 mg  daily until delivery. Preeclampsia precautions reviewed.       Follow up in about 4 weeks (around 5/15/2025) for MFM Followup, Repeat Ultrasound, BPP.     Future Appointments   Date Time Provider Department Center   4/28/2025  2:45 PM Patricio Leslie MD [facility]      [facility]    This note was created with the assistance of [employer] voice recognition software. There may be transcription errors as a result of using this technology, however minimal. Effort has been made to ensure accuracy of transcription, but any obvious errors or omissions should be clarified with the author of the document.             [1]   Patient Active Problem List  Diagnosis    Prior pregnancy with fetal demise and current pregnancy in second trimester

## 2025-04-17 ENCOUNTER — PROCEDURE VISIT (OUTPATIENT)
Dept: MATERNAL FETAL MEDICINE | Facility: CLINIC | Age: 25
End: 2025-04-17
Payer: MEDICAID

## 2025-04-17 ENCOUNTER — OFFICE VISIT (OUTPATIENT)
Dept: MATERNAL FETAL MEDICINE | Facility: CLINIC | Age: 25
End: 2025-04-17
Payer: MEDICAID

## 2025-04-17 VITALS
BODY MASS INDEX: 25.88 KG/M2 | HEART RATE: 81 BPM | DIASTOLIC BLOOD PRESSURE: 66 MMHG | WEIGHT: 161 LBS | HEIGHT: 66 IN | SYSTOLIC BLOOD PRESSURE: 129 MMHG

## 2025-04-17 DIAGNOSIS — Z36.89 ENCOUNTER FOR ULTRASOUND TO ASSESS FETAL GROWTH: ICD-10-CM

## 2025-04-17 DIAGNOSIS — O09.292 PRIOR PREGNANCY WITH FETAL DEMISE AND CURRENT PREGNANCY IN SECOND TRIMESTER: ICD-10-CM

## 2025-04-17 DIAGNOSIS — O46.8X2 SUBCHORIONIC HEMORRHAGE IN SECOND TRIMESTER: Primary | ICD-10-CM

## 2025-04-17 PROCEDURE — 3078F DIAST BP <80 MM HG: CPT | Mod: CPTII,S$GLB,, | Performed by: NURSE PRACTITIONER

## 2025-04-17 PROCEDURE — 3008F BODY MASS INDEX DOCD: CPT | Mod: CPTII,S$GLB,, | Performed by: NURSE PRACTITIONER

## 2025-04-17 PROCEDURE — 1159F MED LIST DOCD IN RCRD: CPT | Mod: CPTII,S$GLB,, | Performed by: NURSE PRACTITIONER

## 2025-04-17 PROCEDURE — 99213 OFFICE O/P EST LOW 20 MIN: CPT | Mod: TH,S$GLB,, | Performed by: NURSE PRACTITIONER

## 2025-04-17 PROCEDURE — 3074F SYST BP LT 130 MM HG: CPT | Mod: CPTII,S$GLB,, | Performed by: NURSE PRACTITIONER

## 2025-04-17 PROCEDURE — 1160F RVW MEDS BY RX/DR IN RCRD: CPT | Mod: CPTII,S$GLB,, | Performed by: NURSE PRACTITIONER

## 2025-04-17 PROCEDURE — 76816 OB US FOLLOW-UP PER FETUS: CPT | Mod: S$GLB,,, | Performed by: OBSTETRICS & GYNECOLOGY

## 2025-04-23 DIAGNOSIS — O09.292 PRIOR PREGNANCY WITH FETAL DEMISE AND CURRENT PREGNANCY IN SECOND TRIMESTER: ICD-10-CM

## 2025-04-23 DIAGNOSIS — Z36.89 ENCOUNTER FOR ULTRASOUND TO ASSESS FETAL GROWTH: ICD-10-CM

## 2025-04-23 DIAGNOSIS — O46.8X2 SUBCHORIONIC HEMORRHAGE IN SECOND TRIMESTER: Primary | ICD-10-CM

## 2025-05-15 ENCOUNTER — OFFICE VISIT (OUTPATIENT)
Dept: MATERNAL FETAL MEDICINE | Facility: CLINIC | Age: 25
End: 2025-05-15
Payer: MEDICAID

## 2025-05-15 ENCOUNTER — PROCEDURE VISIT (OUTPATIENT)
Dept: MATERNAL FETAL MEDICINE | Facility: CLINIC | Age: 25
End: 2025-05-15
Payer: MEDICAID

## 2025-05-15 VITALS
WEIGHT: 164.81 LBS | HEART RATE: 81 BPM | HEIGHT: 66 IN | DIASTOLIC BLOOD PRESSURE: 66 MMHG | BODY MASS INDEX: 26.49 KG/M2 | SYSTOLIC BLOOD PRESSURE: 127 MMHG

## 2025-05-15 DIAGNOSIS — Z36.89 ENCOUNTER FOR ULTRASOUND TO ASSESS FETAL GROWTH: ICD-10-CM

## 2025-05-15 DIAGNOSIS — O09.292 PRIOR PREGNANCY WITH FETAL DEMISE AND CURRENT PREGNANCY IN SECOND TRIMESTER: ICD-10-CM

## 2025-05-15 DIAGNOSIS — O09.292 PRIOR PREGNANCY WITH FETAL DEMISE AND CURRENT PREGNANCY IN SECOND TRIMESTER: Primary | ICD-10-CM

## 2025-05-15 DIAGNOSIS — O46.8X2 SUBCHORIONIC HEMORRHAGE IN SECOND TRIMESTER: ICD-10-CM

## 2025-05-15 PROCEDURE — 3008F BODY MASS INDEX DOCD: CPT | Mod: CPTII,S$GLB,, | Performed by: OBSTETRICS & GYNECOLOGY

## 2025-05-15 PROCEDURE — 1160F RVW MEDS BY RX/DR IN RCRD: CPT | Mod: CPTII,S$GLB,, | Performed by: OBSTETRICS & GYNECOLOGY

## 2025-05-15 PROCEDURE — 76816 OB US FOLLOW-UP PER FETUS: CPT | Mod: S$GLB,,, | Performed by: OBSTETRICS & GYNECOLOGY

## 2025-05-15 PROCEDURE — 1159F MED LIST DOCD IN RCRD: CPT | Mod: CPTII,S$GLB,, | Performed by: OBSTETRICS & GYNECOLOGY

## 2025-05-15 PROCEDURE — 3078F DIAST BP <80 MM HG: CPT | Mod: CPTII,S$GLB,, | Performed by: OBSTETRICS & GYNECOLOGY

## 2025-05-15 PROCEDURE — 76819 FETAL BIOPHYS PROFIL W/O NST: CPT | Mod: S$GLB,,, | Performed by: OBSTETRICS & GYNECOLOGY

## 2025-05-15 PROCEDURE — 3074F SYST BP LT 130 MM HG: CPT | Mod: CPTII,S$GLB,, | Performed by: OBSTETRICS & GYNECOLOGY

## 2025-05-15 PROCEDURE — 99213 OFFICE O/P EST LOW 20 MIN: CPT | Mod: TH,S$GLB,, | Performed by: OBSTETRICS & GYNECOLOGY

## 2025-05-15 NOTE — PROGRESS NOTES
"  Maternal Fetal Medicine Follow Up    Subjective:     Patient ID: [id number]    Chief Complaint: [facility] [place] followup w/us      HPI: Shani Stephens is a 24 y.o. female  at 31w4d gestation with Estimated Date of Delivery: 25  who is here for follow-up consultation by M.    She has history of fetal demise in utero at 38 weeks 1 day in her 2nd pregnancy.  She was induced and delivered vaginally.  Infant weighed 7 lb, noted to have tight nuchal cord at birth.  She recently had a miscarriage in 2024 at 7 weeks gestation and had a D&C.  She had negative APS testing this pregnancy.  She is on low-dose aspirin once daily for preeclampsia prophylaxis.       Interval history since last [facility] visit: None.. She denies any leaking fluid, vaginal bleeding, contractions, decreased fetal movement. Denies headaches, visual disturbances, or epigastric pain.    Pregnancy complications include:   Problem List[1]    No changes to medical, surgical, family, social, or obstetric history.    Medications:  Current Outpatient Medications   Medication Instructions    aspirin (ECOTRIN) 81 mg, Oral, Daily    PNV,calcium 72-iron,carb-folic (PRENATAL PLUS) 29 mg iron- 1 mg Tab 1 tablet, Oral, Daily       Review of Systems   12 point review of systems conducted, negative except as stated in the history of present illness. See HPI for details.      Objective:     Visit Vitals  /66 (BP Location: Left arm, Patient Position: Sitting)   Pulse 81   Ht 5' 6" (1.676 m)   Wt 74.8 kg (164 lb 12.8 oz)   LMP 10/20/2024   BMI 26.60 kg/m²        Physical Exam  Vitals and nursing note reviewed.   Constitutional:       Appearance: Normal appearance.   HENT:      Head: Normocephalic and atraumatic.      Nose: Nose normal. No congestion.   Cardiovascular:      Rate and Rhythm: Normal rate.   Pulmonary:      Effort: Pulmonary effort is normal.   Skin:     Findings: No rash.   Neurological:      Mental Status: She is alert and oriented to " person, place, and time.   Psychiatric:         Mood and Affect: Mood normal.         Behavior: Behavior normal.         Thought Content: Thought content normal.         Judgment: Judgment normal.         Assessment/Plan:     24 y.o.  female with IUP at 31w4d    History of FDIU at 38 weeks in previous pregnancy  There is normal fetal growth with an EFW of 1910 g at the 37% and the AC at the 68% on 5/15/25  AFV is normal. Biophysical profile  on today, 05/15/2025      Reviewed with her risk for recurrence with no predictability or prevention available, except for close monitoring with expectant management that is not 100% protective against adverse outcomes. With possible association with Antiphospholipid antibody syndrome, she previously had negative APS testing.    Recommend monitoring fetal growth every four weeks with fetal testing to be done twice weekly, alternating with primary OB, until delivery.  Reviewed fetal kick count instructions.    Previously discussed lack of consensus on optimal time of delivery with hx of fetal demise. Different opinions about optimal timing of delivery exist, in view of lack of data to support a specific approach, with consideration for delivery at 38-39 weeks,. Risks/benefits of each option, including prematurity with 38 weeks delivery, vs risk of abruption, demise if waiting until 39 weeks, were discussed and patient more comfortable with delivery at 38 weeks, knowing benefits and risks of both options. She will discuss further with Primary OB.    Reviewed instructions to report any significant cramping or any vaginal bleeding. Reviewed fetal kick count instructions.    Preeclampsia prophylaxis  With her risk factors for preeclampsia, she will continue asa 81 mg daily until delivery. Preeclampsia precautions reviewed.       Fetal growth is normal.  We will plan to start twice weekly fetal testing till delivery.  Next week we will have a non RAD BPP of the hospital we  will see her back in the office in 2 weeks and she will have an NST with Dr. Leslie every Monday.    Follow up in about 2 weeks (around 5/29/2025) for MFM Followup, BPP; 3 weeks BPP only; 4 weeks MFMFU Repeat US BPP.     Future Appointments   Date Time Provider Department Center   5/19/2025  2:30 PM Patricio Leslie MD [facility]   5/28/2025 11:15 AM ROOM 3, [facility]   5/28/2025 11:30 AM Choco Little MD [facility]   6/5/2025 11:00 AM ROOM 2, [facility]   6/5/2025 11:15 AM Choco Little MD [facility]   6/12/2025 11:00 AM ROOM 1, [facility]   6/12/2025 11:30 AM Choco Little MD [facility]      Patient was evaluated and examined by Dr. Little. Cassie Kenny MA, helped in pre charting of part of note.     This note was created with the assistance of [employer] voice recognition software. There may be transcription errors as a result of using this technology, however minimal. Effort has been made to ensure accuracy of transcription, but any obvious errors or omissions should be clarified with the author of the document.               [1]   Patient Active Problem List  Diagnosis    Prior pregnancy with fetal demise and current pregnancy in second trimester

## 2025-05-19 DIAGNOSIS — O09.292 PRIOR PREGNANCY WITH FETAL DEMISE AND CURRENT PREGNANCY IN SECOND TRIMESTER: Primary | ICD-10-CM

## 2025-05-19 DIAGNOSIS — Z36.89 ENCOUNTER FOR ULTRASOUND TO ASSESS FETAL GROWTH: ICD-10-CM

## 2025-05-21 ENCOUNTER — TELEPHONE (OUTPATIENT)
Dept: MATERNAL FETAL MEDICINE | Facility: CLINIC | Age: 25
End: 2025-05-21
Payer: MEDICAID

## 2025-05-21 NOTE — TELEPHONE ENCOUNTER
----- Message from Med Assistant Keller sent at 5/21/2025 11:11 AM CDT -----  Regarding: NST/BPP  Patient would like a call back about the NST/BPP scheduled at the hospital. She can be reached at [phone]    Pt called to see when the time was for the BPP/NST that we scheduled

## 2025-05-22 ENCOUNTER — HOSPITAL ENCOUNTER (OUTPATIENT)
Facility: HOSPITAL | Age: 25
LOS: 1 days | Discharge: HOME | End: 2025-05-22
Attending: OBSTETRICS & GYNECOLOGY | Admitting: OBSTETRICS & GYNECOLOGY
Payer: MEDICAID

## 2025-05-22 VITALS
TEMPERATURE: 98 F | DIASTOLIC BLOOD PRESSURE: 70 MMHG | HEIGHT: 65 IN | OXYGEN SATURATION: 100 % | BODY MASS INDEX: 27.32 KG/M2 | SYSTOLIC BLOOD PRESSURE: 119 MMHG | HEART RATE: 74 BPM | WEIGHT: 164 LBS | RESPIRATION RATE: 18 BRPM

## 2025-05-22 DIAGNOSIS — O09.292 PRIOR PREGNANCY WITH FETAL DEMISE AND CURRENT PREGNANCY IN SECOND TRIMESTER: Primary | ICD-10-CM

## 2025-05-22 DIAGNOSIS — O46.8X9 SUBCHORIONIC HEMORRHAGE: ICD-10-CM

## 2025-05-22 PROCEDURE — 59025 FETAL NON-STRESS TEST: CPT

## 2025-05-22 NOTE — PROCEDURES
BIOPHYSICAL PROFILE  REPORT    DATE OF ULTRASOUND: 2025     INDICATION:  History of fetal demise in utero     Gestational Age: 32w4d     Movement: 2/2  Tone:  2/2  Breathin/2  Fluid:  2/2 deepest pocket of 4.4 cm    FHR:  137 bpm during ultrasound    GRADY:  7.8 cm    Presentation: Cephalic        Impression:  Biophysical profile      Discussed with the nurse encouraged to increase oral hydration and keep follow-up with Dr. Leslie and with  us.  Continue fetal kick counts    Choco Little MD       Components of this note were documented using voice recognition systems; and are subject to errors not corrected at proof reading. Please contact author for any clarifications.

## 2025-05-27 NOTE — PROGRESS NOTES
"  Maternal Fetal Medicine Follow Up    Subjective:     Patient ID: [id number]    Chief Complaint: [facility] follow up with US      HPI: Shani Stephens is a 24 y.o. female  at 33w3d gestation with Estimated Date of Delivery: 25  who is here for follow-up consultation by M.    She has history of fetal demise in utero at 38 weeks 1 day in her 2nd pregnancy.  She was induced and delivered vaginally.  Infant weighed 7 lb, noted to have tight nuchal cord at birth.  She recently had a miscarriage in 2024 at 7 weeks gestation and had a D&C.  She had negative APS testing this pregnancy.  She is on low-dose aspirin once daily for preeclampsia prophylaxis.       Interval history since last [facility] visit: None.. She denies any leaking fluid, vaginal bleeding, contractions, decreased fetal movement. Denies headaches, visual disturbances, or epigastric pain.    Pregnancy complications include:   Problem List[1]    No changes to medical, surgical, family, social, or obstetric history.    Medications:  Current Outpatient Medications   Medication Instructions    aspirin (ECOTRIN) 81 mg, Oral, Daily    PNV,calcium 72-iron,carb-folic (PRENATAL PLUS) 29 mg iron- 1 mg Tab 1 tablet, Oral, Daily       Review of Systems   12 point review of systems conducted, negative except as stated in the history of present illness. See HPI for details.      Objective:     Visit Vitals  /65 (BP Location: Left arm, Patient Position: Sitting)   Pulse 79   Ht 5' 5" (1.651 m)   Wt 75.3 kg (166 lb)   LMP 10/20/2024   BMI 27.62 kg/m²        Physical Exam  Vitals and nursing note reviewed.   Constitutional:       Appearance: Normal appearance.   HENT:      Head: Normocephalic and atraumatic.      Nose: Nose normal. No congestion.   Cardiovascular:      Rate and Rhythm: Normal rate.   Pulmonary:      Effort: Pulmonary effort is normal.   Skin:     Findings: No rash.   Neurological:      Mental Status: She is alert and oriented to person, " place, and time.   Psychiatric:         Mood and Affect: Mood normal.         Behavior: Behavior normal.         Thought Content: Thought content normal.         Judgment: Judgment normal.         Assessment/Plan:     24 y.o.  female with IUP at 33w3d    History of FDIU at 38 weeks in previous pregnancy  There is normal fetal growth with an EFW of 1910 g at the 37% and the AC at the 68% on 5/15/25  AFV is normal. BPP 8/8 on 2025.    Reviewed with her risk for recurrence with no predictability or prevention available, except for close monitoring with expectant management that is not 100% protective against adverse outcomes. With possible association with Antiphospholipid antibody syndrome, she previously had negative APS testing.    Recommend monitoring fetal growth every four weeks with fetal testing to be done twice weekly, alternating with primary OB, until delivery.  Reviewed fetal kick count instructions.    Previously discussed lack of consensus on optimal time of delivery with hx of fetal demise. Different opinions about optimal timing of delivery exist, in view of lack of data to support a specific approach, with consideration for delivery at 38-39 weeks,. Risks/benefits of each option, including prematurity with 38 weeks delivery, vs risk of abruption, demise if waiting until 39 weeks, were discussed and patient more comfortable with delivery at 38 weeks, knowing benefits and risks of both options. She will discuss further with Primary OB.    Reviewed instructions to report any significant cramping or any vaginal bleeding. Reviewed fetal kick count instructions.    Preeclampsia prophylaxis  With her risk factors for preeclampsia, she will continue asa 81 mg daily until delivery. Preeclampsia precautions reviewed.     Reassuring fetal testing.  Continue twice weekly fetal testing alternating with Dr. Leslie.  Continue daily aspirin.  Fetal kick count instructions reviewed.    Follow up for keep  return appointment.     Future Appointments   Date Time Provider Department Center   6/2/2025 10:15 AM Patricio Leslie MD [facility]   6/5/2025 11:00 AM ROOM 1, [facility]   6/5/2025 11:15 AM Choco Little MD [facility]   6/12/2025 11:00 AM ROOM 1, [facility]   6/12/2025 11:30 AM Choco iLttle MD [facility]      Patient was evaluated and examined by Dr. Little. Marjorie Reeder MA, helped in pre charting of part of note.    This note was created with the assistance of [employer] voice recognition software. There may be transcription errors as a result of using this technology, however minimal. Effort has been made to ensure accuracy of transcription, but any obvious errors or omissions should be clarified with the author of the document.                 [1]   Patient Active Problem List  Diagnosis    Prior pregnancy with fetal demise and current pregnancy in third trimester

## 2025-05-28 ENCOUNTER — OFFICE VISIT (OUTPATIENT)
Dept: MATERNAL FETAL MEDICINE | Facility: CLINIC | Age: 25
End: 2025-05-28
Payer: MEDICAID

## 2025-05-28 ENCOUNTER — PROCEDURE VISIT (OUTPATIENT)
Dept: MATERNAL FETAL MEDICINE | Facility: CLINIC | Age: 25
End: 2025-05-28
Payer: MEDICAID

## 2025-05-28 VITALS
HEIGHT: 65 IN | WEIGHT: 166 LBS | BODY MASS INDEX: 27.66 KG/M2 | SYSTOLIC BLOOD PRESSURE: 125 MMHG | HEART RATE: 79 BPM | DIASTOLIC BLOOD PRESSURE: 65 MMHG

## 2025-05-28 DIAGNOSIS — O09.292 PRIOR PREGNANCY WITH FETAL DEMISE AND CURRENT PREGNANCY IN SECOND TRIMESTER: ICD-10-CM

## 2025-05-28 DIAGNOSIS — O09.293 PRIOR PREGNANCY WITH FETAL DEMISE AND CURRENT PREGNANCY IN THIRD TRIMESTER: Primary | ICD-10-CM

## 2025-05-28 PROCEDURE — 3078F DIAST BP <80 MM HG: CPT | Mod: CPTII,S$GLB,, | Performed by: OBSTETRICS & GYNECOLOGY

## 2025-05-28 PROCEDURE — 99213 OFFICE O/P EST LOW 20 MIN: CPT | Mod: TH,S$GLB,, | Performed by: OBSTETRICS & GYNECOLOGY

## 2025-05-28 PROCEDURE — 76819 FETAL BIOPHYS PROFIL W/O NST: CPT | Mod: S$GLB,,, | Performed by: OBSTETRICS & GYNECOLOGY

## 2025-05-28 PROCEDURE — 1160F RVW MEDS BY RX/DR IN RCRD: CPT | Mod: CPTII,S$GLB,, | Performed by: OBSTETRICS & GYNECOLOGY

## 2025-05-28 PROCEDURE — 3008F BODY MASS INDEX DOCD: CPT | Mod: CPTII,S$GLB,, | Performed by: OBSTETRICS & GYNECOLOGY

## 2025-05-28 PROCEDURE — 3074F SYST BP LT 130 MM HG: CPT | Mod: CPTII,S$GLB,, | Performed by: OBSTETRICS & GYNECOLOGY

## 2025-05-28 PROCEDURE — 1159F MED LIST DOCD IN RCRD: CPT | Mod: CPTII,S$GLB,, | Performed by: OBSTETRICS & GYNECOLOGY

## 2025-05-28 PROCEDURE — 1111F DSCHRG MED/CURRENT MED MERGE: CPT | Mod: CPTII,S$GLB,, | Performed by: OBSTETRICS & GYNECOLOGY

## 2025-06-05 ENCOUNTER — OFFICE VISIT (OUTPATIENT)
Dept: MATERNAL FETAL MEDICINE | Facility: CLINIC | Age: 25
End: 2025-06-05
Payer: MEDICAID

## 2025-06-05 ENCOUNTER — PROCEDURE VISIT (OUTPATIENT)
Dept: MATERNAL FETAL MEDICINE | Facility: CLINIC | Age: 25
End: 2025-06-05
Payer: MEDICAID

## 2025-06-05 VITALS
SYSTOLIC BLOOD PRESSURE: 126 MMHG | WEIGHT: 167 LBS | HEIGHT: 65 IN | BODY MASS INDEX: 27.82 KG/M2 | HEART RATE: 78 BPM | DIASTOLIC BLOOD PRESSURE: 70 MMHG

## 2025-06-05 DIAGNOSIS — O09.292 PRIOR PREGNANCY WITH FETAL DEMISE AND CURRENT PREGNANCY IN SECOND TRIMESTER: ICD-10-CM

## 2025-06-05 DIAGNOSIS — O09.293 PRIOR PREGNANCY WITH FETAL DEMISE AND CURRENT PREGNANCY IN THIRD TRIMESTER: Primary | ICD-10-CM

## 2025-06-05 PROCEDURE — 99213 OFFICE O/P EST LOW 20 MIN: CPT | Mod: TH,S$GLB,, | Performed by: OBSTETRICS & GYNECOLOGY

## 2025-06-05 PROCEDURE — 1111F DSCHRG MED/CURRENT MED MERGE: CPT | Mod: CPTII,S$GLB,, | Performed by: OBSTETRICS & GYNECOLOGY

## 2025-06-05 PROCEDURE — 1159F MED LIST DOCD IN RCRD: CPT | Mod: CPTII,S$GLB,, | Performed by: OBSTETRICS & GYNECOLOGY

## 2025-06-05 PROCEDURE — 1160F RVW MEDS BY RX/DR IN RCRD: CPT | Mod: CPTII,S$GLB,, | Performed by: OBSTETRICS & GYNECOLOGY

## 2025-06-05 PROCEDURE — 3074F SYST BP LT 130 MM HG: CPT | Mod: CPTII,S$GLB,, | Performed by: OBSTETRICS & GYNECOLOGY

## 2025-06-05 PROCEDURE — 3078F DIAST BP <80 MM HG: CPT | Mod: CPTII,S$GLB,, | Performed by: OBSTETRICS & GYNECOLOGY

## 2025-06-05 PROCEDURE — 76819 FETAL BIOPHYS PROFIL W/O NST: CPT | Mod: S$GLB,,, | Performed by: OBSTETRICS & GYNECOLOGY

## 2025-06-05 PROCEDURE — 3008F BODY MASS INDEX DOCD: CPT | Mod: CPTII,S$GLB,, | Performed by: OBSTETRICS & GYNECOLOGY

## 2025-06-09 DIAGNOSIS — O09.93 SUPERVISION OF HIGH RISK PREGNANCY IN THIRD TRIMESTER: Primary | ICD-10-CM

## 2025-06-11 NOTE — PROGRESS NOTES
"  Maternal Fetal Medicine Follow Up    Subjective:     Patient ID: [id number]    Chief Complaint: [facility] followup w/us      HPI: Shani Stephens is a 24 y.o. female  at 35w4d gestation with Estimated Date of Delivery: 25  who is here for follow-up consultation by [facility].    She has history of fetal demise in utero at 38 weeks 1 day in her 2nd pregnancy.  She was induced and delivered vaginally.  Infant weighed 7 lb, noted to have tight nuchal cord at birth.  She recently had a miscarriage in 2024 at 7 weeks gestation and had a D&C.  She had negative APS testing this pregnancy.  She is on low-dose aspirin once daily for preeclampsia prophylaxis. She is accompanied by her significant other.       Interval history since last [facility] visit: None.. She denies any leaking fluid, vaginal bleeding, contractions, decreased fetal movement. Denies headaches, visual disturbances, or epigastric pain.    Pregnancy complications include:   Problem List[1]    No changes to medical, surgical, family, social, or obstetric history.    Medications:  Current Outpatient Medications   Medication Instructions    aspirin (ECOTRIN) 81 mg, Oral, Daily    PNV,calcium 72-iron,carb-folic (PRENATAL PLUS) 29 mg iron- 1 mg Tab 1 tablet, Oral, Daily       Review of Systems   12 point review of systems conducted, negative except as stated in the history of present illness. See HPI for details.      Objective:     Visit Vitals  /74 (BP Location: Left arm, Patient Position: Sitting)   Pulse 80   Ht 5' 5" (1.651 m)   Wt 77.8 kg (171 lb 8 oz)   LMP 10/20/2024   BMI 28.54 kg/m²        Physical Exam  Vitals and nursing note reviewed.   Constitutional:       Appearance: Normal appearance.   HENT:      Head: Normocephalic and atraumatic.      Nose: Nose normal. No congestion.   Cardiovascular:      Rate and Rhythm: Normal rate.   Pulmonary:      Effort: Pulmonary effort is normal.   Skin:     Findings: No rash.   Neurological:      Mental " Status: She is alert and oriented to person, place, and time.   Psychiatric:         Mood and Affect: Mood normal.         Behavior: Behavior normal.         Thought Content: Thought content normal.         Judgment: Judgment normal.         Assessment/Plan:     24 y.o.  female with IUP at 35w4d    History of FDIU at 38 weeks in previous pregnancy  There is normal fetal growth with an EFW of 2823 g at the 43% and the AC at the 74% on 2025  AFV is normal. BPP 8/8 on 2025.    Reviewed with her risk for recurrence with no predictability or prevention available, except for close monitoring with expectant management that is not 100% protective against adverse outcomes. With possible association with Antiphospholipid antibody syndrome, she previously had negative APS testing.    Recommend twice weekly fetal testing, alternating with primary OB, until delivery.  Reviewed fetal kick count instructions.    Previously discussed lack of consensus on optimal time of delivery with hx of fetal demise. Different opinions about optimal timing of delivery exist, in view of lack of data to support a specific approach, with consideration for delivery at 38-39 weeks,. Risks/benefits of each option, including prematurity with 38 weeks delivery, vs risk of abruption, demise if waiting until 39 weeks, were discussed and patient more comfortable with delivery at 38 weeks, knowing benefits and risks of both options.    Reviewed instructions to report any significant cramping or any vaginal bleeding. Reviewed fetal kick count instructions.      Preeclampsia prophylaxis  With her risk factors for preeclampsia, she will continue asa 81 mg daily until delivery. Preeclampsia precautions reviewed.     There is continued normal fetal growth.  Fetal testing is reassuring.  We will continue twice weekly testing till delivery that is planned at 38 weeks gestation.    Follow up for Keep return appointment..     Future Appointments    Date Time Provider Department Center   6/16/2025 12:45 PM Patricio Leslie MD [facility]   6/19/2025  2:30 PM [facility] Little [facility]   6/19/2025  2:30 PM ROOM 3, [facility]   6/26/2025 11:00 AM Choco Little MD [facility]   6/26/2025 11:00 AM ROOM 2, [facility]      SCRIBE #1 NOTE: I, Natacha Anton am scribing for, and in the presence of,  Choco Little MD. I have scribed the following portions of the note - Other sections scribed: HPI and Assessment/Plan.           This note was created with the assistance of [employer] voice recognition software. There may be transcription errors as a result of using this technology, however minimal. Effort has been made to ensure accuracy of transcription, but any obvious errors or omissions should be clarified with the author of the document.             [1]   Patient Active Problem List  Diagnosis    Prior pregnancy with fetal demise and current pregnancy in third trimester

## 2025-06-12 ENCOUNTER — OFFICE VISIT (OUTPATIENT)
Dept: MATERNAL FETAL MEDICINE | Facility: CLINIC | Age: 25
End: 2025-06-12
Payer: MEDICAID

## 2025-06-12 ENCOUNTER — PROCEDURE VISIT (OUTPATIENT)
Dept: MATERNAL FETAL MEDICINE | Facility: CLINIC | Age: 25
End: 2025-06-12
Payer: MEDICAID

## 2025-06-12 VITALS
BODY MASS INDEX: 28.57 KG/M2 | HEIGHT: 65 IN | DIASTOLIC BLOOD PRESSURE: 74 MMHG | HEART RATE: 80 BPM | SYSTOLIC BLOOD PRESSURE: 117 MMHG | WEIGHT: 171.5 LBS

## 2025-06-12 DIAGNOSIS — O09.293 PRIOR PREGNANCY WITH FETAL DEMISE AND CURRENT PREGNANCY IN THIRD TRIMESTER: Primary | ICD-10-CM

## 2025-06-12 DIAGNOSIS — O09.292 PRIOR PREGNANCY WITH FETAL DEMISE AND CURRENT PREGNANCY IN SECOND TRIMESTER: ICD-10-CM

## 2025-06-12 DIAGNOSIS — Z36.89 ENCOUNTER FOR ULTRASOUND TO ASSESS FETAL GROWTH: ICD-10-CM

## 2025-06-12 PROCEDURE — 1160F RVW MEDS BY RX/DR IN RCRD: CPT | Mod: CPTII,S$GLB,, | Performed by: OBSTETRICS & GYNECOLOGY

## 2025-06-12 PROCEDURE — 1111F DSCHRG MED/CURRENT MED MERGE: CPT | Mod: CPTII,S$GLB,, | Performed by: OBSTETRICS & GYNECOLOGY

## 2025-06-12 PROCEDURE — 3074F SYST BP LT 130 MM HG: CPT | Mod: CPTII,S$GLB,, | Performed by: OBSTETRICS & GYNECOLOGY

## 2025-06-12 PROCEDURE — 99213 OFFICE O/P EST LOW 20 MIN: CPT | Mod: TH,S$GLB,, | Performed by: OBSTETRICS & GYNECOLOGY

## 2025-06-12 PROCEDURE — 3078F DIAST BP <80 MM HG: CPT | Mod: CPTII,S$GLB,, | Performed by: OBSTETRICS & GYNECOLOGY

## 2025-06-12 PROCEDURE — 76816 OB US FOLLOW-UP PER FETUS: CPT | Mod: S$GLB,,, | Performed by: OBSTETRICS & GYNECOLOGY

## 2025-06-12 PROCEDURE — 76819 FETAL BIOPHYS PROFIL W/O NST: CPT | Mod: S$GLB,,, | Performed by: OBSTETRICS & GYNECOLOGY

## 2025-06-12 PROCEDURE — 3008F BODY MASS INDEX DOCD: CPT | Mod: CPTII,S$GLB,, | Performed by: OBSTETRICS & GYNECOLOGY

## 2025-06-12 PROCEDURE — 1159F MED LIST DOCD IN RCRD: CPT | Mod: CPTII,S$GLB,, | Performed by: OBSTETRICS & GYNECOLOGY

## 2025-06-18 NOTE — PROGRESS NOTES
"  Maternal Fetal Medicine Follow Up    Subjective:     Patient ID: [id number]    Chief Complaint: [facility] followup w/us      HPI: Shani Stephens is a 25 y.o. female  at 36w4d gestation with Estimated Date of Delivery: 25  who is here for follow-up consultation by M.    She has history of fetal demise in utero at 38 weeks 1 day in her 2nd pregnancy.  She was induced and delivered vaginally.  Infant weighed 7 lb, noted to have tight nuchal cord at birth.  She recently had a miscarriage in 2024 at 7 weeks gestation and had a D&C.  She had negative APS testing this pregnancy.  She is on low-dose aspirin once daily for preeclampsia prophylaxis.       Interval history since last [facility] visit: None.. She denies any leaking fluid, vaginal bleeding, contractions, decreased fetal movement. Denies headaches, visual disturbances, or epigastric pain.    Pregnancy complications include:   Problem List[1]    No changes to medical, surgical, family, social, or obstetric history.    Medications:  Current Outpatient Medications   Medication Instructions    aspirin (ECOTRIN) 81 mg, Oral, Daily    PNV,calcium 72-iron,carb-folic (PRENATAL PLUS) 29 mg iron- 1 mg Tab 1 tablet, Oral, Daily       Review of Systems   12 point review of systems conducted, negative except as stated in the history of present illness. See HPI for details.      Objective:     Visit Vitals  /76 (BP Location: Left arm, Patient Position: Sitting)   Pulse 74   Ht 5' 5" (1.651 m)   Wt 78.2 kg (172 lb 8 oz)   LMP 10/20/2024   BMI 28.71 kg/m²        Physical Exam  Vitals and nursing note reviewed.   Constitutional:       Appearance: Normal appearance.   HENT:      Head: Normocephalic and atraumatic.      Nose: Nose normal. No congestion.   Cardiovascular:      Rate and Rhythm: Normal rate.   Pulmonary:      Effort: Pulmonary effort is normal.   Skin:     Findings: No rash.   Neurological:      Mental Status: She is alert and oriented to person, " place, and time.   Psychiatric:         Mood and Affect: Mood normal.         Behavior: Behavior normal.         Thought Content: Thought content normal.         Judgment: Judgment normal.         Assessment/Plan:     25 y.o.  female with IUP at 36w4d    History of FDIU at 38 weeks in previous pregnancy  Normal fetal growth with an EFW of 2823 g at the 43% and the AC at the 74% on 2025  AFV is normal. BPP 8/8 on 2025.    Reviewed with her risk for recurrence with no predictability or prevention available, except for close monitoring with expectant management that is not 100% protective against adverse outcomes. With possible association with Antiphospholipid antibody syndrome, she previously had negative APS testing.    Recommend twice weekly fetal testing, alternating with primary OB, until delivery.  Reviewed fetal kick count instructions.    Previously discussed lack of consensus on optimal time of delivery with hx of fetal demise. Different opinions about optimal timing of delivery exist, in view of lack of data to support a specific approach, with consideration for delivery at 38-39 weeks,. Risks/benefits of each option, including prematurity with 38 weeks delivery, vs risk of abruption, demise if waiting until 39 weeks, were discussed and patient more comfortable with delivery at 38 weeks, knowing benefits and risks of both options.    Reviewed instructions to report any significant cramping or any vaginal bleeding. Reviewed fetal kick count instructions.      Preeclampsia prophylaxis  With her risk factors for preeclampsia, she will continue asa 81 mg daily until delivery. Preeclampsia precautions reviewed.     Fetal testing is reassuring.  We will continue twice weekly fetal testing alternating with Dr. Leslie.  Fetal kick count instructions.    Follow up in about 1 week (around 2025) for MFM follow-up, BPP.     Future Appointments   Date Time Provider Department Center   2025  12:45 PM Patricio Leslie MD [facility]   6/26/2025 11:00 AM Choco Little MD [facility]   6/26/2025 11:00 AM [facility]      SCRIBE #1 NOTE: I, Natacha Anton, am scribing for, and in the presence of,  Choco Little MD. I have scribed the following portions of the note - Other sections scribed: HPI and Assessment/Plan.         This note was created with the assistance of [employer] voice recognition software. There may be transcription errors as a result of using this technology, however minimal. Effort has been made to ensure accuracy of transcription, but any obvious errors or omissions should be clarified with the author of the document.               [1]   Patient Active Problem List  Diagnosis    Prior pregnancy with fetal demise and current pregnancy in third trimester

## 2025-06-19 ENCOUNTER — OFFICE VISIT (OUTPATIENT)
Dept: MATERNAL FETAL MEDICINE | Facility: CLINIC | Age: 25
End: 2025-06-19
Payer: MEDICAID

## 2025-06-19 ENCOUNTER — LAB VISIT (OUTPATIENT)
Dept: LAB | Facility: HOSPITAL | Age: 25
End: 2025-06-19
Attending: OBSTETRICS & GYNECOLOGY
Payer: MEDICAID

## 2025-06-19 ENCOUNTER — PROCEDURE VISIT (OUTPATIENT)
Dept: MATERNAL FETAL MEDICINE | Facility: CLINIC | Age: 25
End: 2025-06-19
Payer: MEDICAID

## 2025-06-19 VITALS
SYSTOLIC BLOOD PRESSURE: 129 MMHG | HEART RATE: 74 BPM | DIASTOLIC BLOOD PRESSURE: 76 MMHG | WEIGHT: 172.5 LBS | BODY MASS INDEX: 28.74 KG/M2 | HEIGHT: 65 IN

## 2025-06-19 DIAGNOSIS — O09.293 PRIOR PREGNANCY WITH FETAL DEMISE AND CURRENT PREGNANCY IN THIRD TRIMESTER: Primary | ICD-10-CM

## 2025-06-19 DIAGNOSIS — O09.93 SUPERVISION OF HIGH RISK PREGNANCY IN THIRD TRIMESTER: ICD-10-CM

## 2025-06-19 DIAGNOSIS — O09.93 ENCOUNTER FOR SUPERVISION OF HIGH RISK PREGNANCY IN THIRD TRIMESTER, ANTEPARTUM: ICD-10-CM

## 2025-06-19 LAB
BASOPHILS # BLD AUTO: 0.01 X10(3)/MCL
BASOPHILS NFR BLD AUTO: 0.1 %
EOSINOPHIL # BLD AUTO: 0.04 X10(3)/MCL (ref 0–0.9)
EOSINOPHIL NFR BLD AUTO: 0.5 %
ERYTHROCYTE [DISTWIDTH] IN BLOOD BY AUTOMATED COUNT: 13.6 % (ref 11.5–17)
HCT VFR BLD AUTO: 34.5 % (ref 37–47)
HGB BLD-MCNC: 10.9 G/DL (ref 12–16)
IMM GRANULOCYTES # BLD AUTO: 0.04 X10(3)/MCL (ref 0–0.04)
IMM GRANULOCYTES NFR BLD AUTO: 0.5 %
LYMPHOCYTES # BLD AUTO: 1.29 X10(3)/MCL (ref 0.6–4.6)
LYMPHOCYTES NFR BLD AUTO: 16.3 %
MCH RBC QN AUTO: 25.3 PG (ref 27–31)
MCHC RBC AUTO-ENTMCNC: 31.6 G/DL (ref 33–36)
MCV RBC AUTO: 80.2 FL (ref 80–94)
MONOCYTES # BLD AUTO: 0.95 X10(3)/MCL (ref 0.1–1.3)
MONOCYTES NFR BLD AUTO: 12 %
NEUTROPHILS # BLD AUTO: 5.58 X10(3)/MCL (ref 2.1–9.2)
NEUTROPHILS NFR BLD AUTO: 70.6 %
NRBC BLD AUTO-RTO: 0 %
PLATELET # BLD AUTO: 186 X10(3)/MCL (ref 130–400)
PMV BLD AUTO: 10.9 FL (ref 7.4–10.4)
RBC # BLD AUTO: 4.3 X10(6)/MCL (ref 4.2–5.4)
T PALLIDUM AB SER QL: NONREACTIVE
WBC # BLD AUTO: 7.91 X10(3)/MCL (ref 4.5–11.5)

## 2025-06-19 PROCEDURE — 86780 TREPONEMA PALLIDUM: CPT

## 2025-06-19 PROCEDURE — 36415 COLL VENOUS BLD VENIPUNCTURE: CPT

## 2025-06-19 PROCEDURE — 3074F SYST BP LT 130 MM HG: CPT | Mod: CPTII,S$GLB,, | Performed by: OBSTETRICS & GYNECOLOGY

## 2025-06-19 PROCEDURE — 99213 OFFICE O/P EST LOW 20 MIN: CPT | Mod: TH,S$GLB,, | Performed by: OBSTETRICS & GYNECOLOGY

## 2025-06-19 PROCEDURE — 76819 FETAL BIOPHYS PROFIL W/O NST: CPT | Mod: S$GLB,,, | Performed by: OBSTETRICS & GYNECOLOGY

## 2025-06-19 PROCEDURE — 87389 HIV-1 AG W/HIV-1&-2 AB AG IA: CPT

## 2025-06-19 PROCEDURE — 3078F DIAST BP <80 MM HG: CPT | Mod: CPTII,S$GLB,, | Performed by: OBSTETRICS & GYNECOLOGY

## 2025-06-19 PROCEDURE — 1160F RVW MEDS BY RX/DR IN RCRD: CPT | Mod: CPTII,S$GLB,, | Performed by: OBSTETRICS & GYNECOLOGY

## 2025-06-19 PROCEDURE — 85025 COMPLETE CBC W/AUTO DIFF WBC: CPT

## 2025-06-19 PROCEDURE — 1159F MED LIST DOCD IN RCRD: CPT | Mod: CPTII,S$GLB,, | Performed by: OBSTETRICS & GYNECOLOGY

## 2025-06-19 PROCEDURE — 1111F DSCHRG MED/CURRENT MED MERGE: CPT | Mod: CPTII,S$GLB,, | Performed by: OBSTETRICS & GYNECOLOGY

## 2025-06-19 PROCEDURE — 3008F BODY MASS INDEX DOCD: CPT | Mod: CPTII,S$GLB,, | Performed by: OBSTETRICS & GYNECOLOGY

## 2025-06-20 LAB — HIV 1+2 AB+HIV1 P24 AG SERPL QL IA: NONREACTIVE

## 2025-06-23 ENCOUNTER — HOSPITAL ENCOUNTER (INPATIENT)
Facility: HOSPITAL | Age: 25
LOS: 3 days | Discharge: HOME | End: 2025-06-26
Attending: OBSTETRICS & GYNECOLOGY | Admitting: OBSTETRICS & GYNECOLOGY
Payer: MEDICAID

## 2025-06-23 ENCOUNTER — ANESTHESIA EVENT (OUTPATIENT)
Dept: OBSTETRICS AND GYNECOLOGY | Facility: HOSPITAL | Age: 25
End: 2025-06-23
Payer: MEDICAID

## 2025-06-23 DIAGNOSIS — Z37.9 NORMAL LABOR: Primary | ICD-10-CM

## 2025-06-23 DIAGNOSIS — Z78.9 ADMITTED TO LABOR AND DELIVERY: ICD-10-CM

## 2025-06-23 LAB
BASOPHILS # BLD AUTO: 0.01 X10(3)/MCL
BASOPHILS NFR BLD AUTO: 0.1 %
EOSINOPHIL # BLD AUTO: 0.05 X10(3)/MCL (ref 0–0.9)
EOSINOPHIL NFR BLD AUTO: 0.6 %
ERYTHROCYTE [DISTWIDTH] IN BLOOD BY AUTOMATED COUNT: 13.8 % (ref 11.5–17)
GROUP & RH: NORMAL
HCT VFR BLD AUTO: 33.3 % (ref 37–47)
HGB BLD-MCNC: 10.5 G/DL (ref 12–16)
IMM GRANULOCYTES # BLD AUTO: 0.04 X10(3)/MCL (ref 0–0.04)
IMM GRANULOCYTES NFR BLD AUTO: 0.5 %
INDIRECT COOMBS: NORMAL
LYMPHOCYTES # BLD AUTO: 1.48 X10(3)/MCL (ref 0.6–4.6)
LYMPHOCYTES NFR BLD AUTO: 17 %
MCH RBC QN AUTO: 25.6 PG (ref 27–31)
MCHC RBC AUTO-ENTMCNC: 31.5 G/DL (ref 33–36)
MCV RBC AUTO: 81.2 FL (ref 80–94)
MONOCYTES # BLD AUTO: 0.85 X10(3)/MCL (ref 0.1–1.3)
MONOCYTES NFR BLD AUTO: 9.8 %
NEUTROPHILS # BLD AUTO: 6.26 X10(3)/MCL (ref 2.1–9.2)
NEUTROPHILS NFR BLD AUTO: 72 %
NRBC BLD AUTO-RTO: 0 %
PLATELET # BLD AUTO: 185 X10(3)/MCL (ref 130–400)
PMV BLD AUTO: 10.9 FL (ref 7.4–10.4)
RBC # BLD AUTO: 4.1 X10(6)/MCL (ref 4.2–5.4)
SPECIMEN OUTDATE: NORMAL
T PALLIDUM AB SER QL: NONREACTIVE
WBC # BLD AUTO: 8.69 X10(3)/MCL (ref 4.5–11.5)

## 2025-06-23 PROCEDURE — 85025 COMPLETE CBC W/AUTO DIFF WBC: CPT | Performed by: OBSTETRICS & GYNECOLOGY

## 2025-06-23 PROCEDURE — 36004725 HC OB OR TIME LEV III - EA ADD 15 MIN: Performed by: OBSTETRICS & GYNECOLOGY

## 2025-06-23 PROCEDURE — 71000033 HC RECOVERY, INTIAL HOUR: Performed by: OBSTETRICS & GYNECOLOGY

## 2025-06-23 PROCEDURE — 99900035 HC TECH TIME PER 15 MIN (STAT)

## 2025-06-23 PROCEDURE — 51702 INSERT TEMP BLADDER CATH: CPT

## 2025-06-23 PROCEDURE — 99285 EMERGENCY DEPT VISIT HI MDM: CPT | Mod: 25

## 2025-06-23 PROCEDURE — 63600175 PHARM REV CODE 636 W HCPCS: Performed by: NURSE ANESTHETIST, CERTIFIED REGISTERED

## 2025-06-23 PROCEDURE — 37000009 HC ANESTHESIA EA ADD 15 MINS: Performed by: OBSTETRICS & GYNECOLOGY

## 2025-06-23 PROCEDURE — 37000008 HC ANESTHESIA 1ST 15 MINUTES: Performed by: OBSTETRICS & GYNECOLOGY

## 2025-06-23 PROCEDURE — 27201423 OPTIME MED/SURG SUP & DEVICES STERILE SUPPLY: Performed by: OBSTETRICS & GYNECOLOGY

## 2025-06-23 PROCEDURE — 63600175 PHARM REV CODE 636 W HCPCS: Performed by: OBSTETRICS & GYNECOLOGY

## 2025-06-23 PROCEDURE — 62322 NJX INTERLAMINAR LMBR/SAC: CPT | Performed by: NURSE ANESTHETIST, CERTIFIED REGISTERED

## 2025-06-23 PROCEDURE — 27000492 HC SLEEVE, SCD T/L

## 2025-06-23 PROCEDURE — 11000001 HC ACUTE MED/SURG PRIVATE ROOM

## 2025-06-23 PROCEDURE — 86900 BLOOD TYPING SEROLOGIC ABO: CPT | Performed by: OBSTETRICS & GYNECOLOGY

## 2025-06-23 PROCEDURE — 25000003 PHARM REV CODE 250: Performed by: OBSTETRICS & GYNECOLOGY

## 2025-06-23 PROCEDURE — 36004724 HC OB OR TIME LEV III - 1ST 15 MIN: Performed by: OBSTETRICS & GYNECOLOGY

## 2025-06-23 PROCEDURE — 86780 TREPONEMA PALLIDUM: CPT | Performed by: OBSTETRICS & GYNECOLOGY

## 2025-06-23 RX ORDER — EPHEDRINE SULFATE 50 MG/ML
10 INJECTION, SOLUTION INTRAVENOUS EVERY 5 MIN PRN
Status: DISCONTINUED | OUTPATIENT
Start: 2025-06-23 | End: 2025-06-26 | Stop reason: HOSPADM

## 2025-06-23 RX ORDER — OXYCODONE HYDROCHLORIDE 5 MG/1
10 TABLET ORAL EVERY 4 HOURS PRN
Status: DISCONTINUED | OUTPATIENT
Start: 2025-06-23 | End: 2025-06-23

## 2025-06-23 RX ORDER — METHYLERGONOVINE MALEATE 0.2 MG/ML
200 INJECTION INTRAVENOUS ONCE AS NEEDED
Status: DISCONTINUED | OUTPATIENT
Start: 2025-06-23 | End: 2025-06-26 | Stop reason: HOSPADM

## 2025-06-23 RX ORDER — PROCHLORPERAZINE EDISYLATE 5 MG/ML
5 INJECTION INTRAMUSCULAR; INTRAVENOUS EVERY 6 HOURS PRN
Status: DISCONTINUED | OUTPATIENT
Start: 2025-06-23 | End: 2025-06-26 | Stop reason: HOSPADM

## 2025-06-23 RX ORDER — OXYTOCIN-SODIUM CHLORIDE 0.9% IV SOLN 30 UNIT/500ML 30-0.9/5 UT/ML-%
95 SOLUTION INTRAVENOUS ONCE AS NEEDED
Status: DISCONTINUED | OUTPATIENT
Start: 2025-06-23 | End: 2025-06-26 | Stop reason: HOSPADM

## 2025-06-23 RX ORDER — SODIUM CITRATE AND CITRIC ACID MONOHYDRATE 334; 500 MG/5ML; MG/5ML
30 SOLUTION ORAL
Status: DISCONTINUED | OUTPATIENT
Start: 2025-06-23 | End: 2025-06-26 | Stop reason: HOSPADM

## 2025-06-23 RX ORDER — IBUPROFEN 600 MG/1
600 TABLET, FILM COATED ORAL EVERY 6 HOURS
Status: DISCONTINUED | OUTPATIENT
Start: 2025-06-24 | End: 2025-06-23

## 2025-06-23 RX ORDER — BUPIVACAINE HYDROCHLORIDE 7.5 MG/ML
INJECTION, SOLUTION EPIDURAL; RETROBULBAR
Status: DISCONTINUED | OUTPATIENT
Start: 2025-06-23 | End: 2025-06-23

## 2025-06-23 RX ORDER — PRENATAL WITH FERROUS FUM AND FOLIC ACID 3080; 920; 120; 400; 22; 1.84; 3; 20; 10; 1; 12; 200; 27; 25; 2 [IU]/1; [IU]/1; MG/1; [IU]/1; MG/1; MG/1; MG/1; MG/1; MG/1; MG/1; UG/1; MG/1; MG/1; MG/1; MG/1
1 TABLET ORAL DAILY
Status: DISCONTINUED | OUTPATIENT
Start: 2025-06-24 | End: 2025-06-26 | Stop reason: HOSPADM

## 2025-06-23 RX ORDER — SIMETHICONE 80 MG
1 TABLET,CHEWABLE ORAL EVERY 6 HOURS PRN
Status: DISCONTINUED | OUTPATIENT
Start: 2025-06-23 | End: 2025-06-26 | Stop reason: HOSPADM

## 2025-06-23 RX ORDER — MISOPROSTOL 100 UG/1
800 TABLET ORAL
Status: DISCONTINUED | OUTPATIENT
Start: 2025-06-23 | End: 2025-06-23

## 2025-06-23 RX ORDER — KETOROLAC TROMETHAMINE 30 MG/ML
30 INJECTION, SOLUTION INTRAMUSCULAR; INTRAVENOUS EVERY 8 HOURS
Status: COMPLETED | OUTPATIENT
Start: 2025-06-24 | End: 2025-06-24

## 2025-06-23 RX ORDER — AMOXICILLIN 250 MG
1 CAPSULE ORAL NIGHTLY PRN
Status: DISCONTINUED | OUTPATIENT
Start: 2025-06-23 | End: 2025-06-26 | Stop reason: HOSPADM

## 2025-06-23 RX ORDER — SODIUM CHLORIDE, SODIUM LACTATE, POTASSIUM CHLORIDE, CALCIUM CHLORIDE 600; 310; 30; 20 MG/100ML; MG/100ML; MG/100ML; MG/100ML
INJECTION, SOLUTION INTRAVENOUS CONTINUOUS
Status: DISCONTINUED | OUTPATIENT
Start: 2025-06-23 | End: 2025-06-26 | Stop reason: HOSPADM

## 2025-06-23 RX ORDER — FAMOTIDINE 10 MG/ML
20 INJECTION, SOLUTION INTRAVENOUS
Status: DISCONTINUED | OUTPATIENT
Start: 2025-06-23 | End: 2025-06-26 | Stop reason: HOSPADM

## 2025-06-23 RX ORDER — ONDANSETRON HYDROCHLORIDE 2 MG/ML
INJECTION, SOLUTION INTRAVENOUS
Status: DISCONTINUED | OUTPATIENT
Start: 2025-06-23 | End: 2025-06-23

## 2025-06-23 RX ORDER — MISOPROSTOL 100 UG/1
800 TABLET ORAL ONCE AS NEEDED
Status: DISCONTINUED | OUTPATIENT
Start: 2025-06-23 | End: 2025-06-26 | Stop reason: HOSPADM

## 2025-06-23 RX ORDER — DIPHENHYDRAMINE HCL 25 MG
25 CAPSULE ORAL EVERY 4 HOURS PRN
Status: DISCONTINUED | OUTPATIENT
Start: 2025-06-23 | End: 2025-06-26 | Stop reason: HOSPADM

## 2025-06-23 RX ORDER — OXYCODONE HYDROCHLORIDE 5 MG/1
5 TABLET ORAL EVERY 4 HOURS PRN
Status: ACTIVE | OUTPATIENT
Start: 2025-06-23 | End: 2025-06-24

## 2025-06-23 RX ORDER — FENTANYL CITRATE 50 UG/ML
INJECTION, SOLUTION INTRAMUSCULAR; INTRAVENOUS
Status: DISCONTINUED | OUTPATIENT
Start: 2025-06-23 | End: 2025-06-23

## 2025-06-23 RX ORDER — OXYTOCIN 10 [USP'U]/ML
10 INJECTION, SOLUTION INTRAMUSCULAR; INTRAVENOUS ONCE AS NEEDED
Status: DISCONTINUED | OUTPATIENT
Start: 2025-06-23 | End: 2025-06-26 | Stop reason: HOSPADM

## 2025-06-23 RX ORDER — HYDROMORPHONE HYDROCHLORIDE 2 MG/ML
1 INJECTION, SOLUTION INTRAMUSCULAR; INTRAVENOUS; SUBCUTANEOUS EVERY 4 HOURS PRN
Status: DISCONTINUED | OUTPATIENT
Start: 2025-06-23 | End: 2025-06-26 | Stop reason: HOSPADM

## 2025-06-23 RX ORDER — OXYTOCIN 10 [USP'U]/ML
INJECTION, SOLUTION INTRAMUSCULAR; INTRAVENOUS
Status: DISCONTINUED | OUTPATIENT
Start: 2025-06-23 | End: 2025-06-23

## 2025-06-23 RX ORDER — MUPIROCIN 20 MG/G
OINTMENT TOPICAL 2 TIMES DAILY
Status: DISCONTINUED | OUTPATIENT
Start: 2025-06-24 | End: 2025-06-26 | Stop reason: HOSPADM

## 2025-06-23 RX ORDER — CEFAZOLIN 2 G/1
INJECTION, POWDER, FOR SOLUTION INTRAMUSCULAR; INTRAVENOUS
Status: DISCONTINUED | OUTPATIENT
Start: 2025-06-23 | End: 2025-06-23

## 2025-06-23 RX ORDER — OXYCODONE AND ACETAMINOPHEN 10; 325 MG/1; MG/1
1 TABLET ORAL EVERY 4 HOURS PRN
Status: DISCONTINUED | OUTPATIENT
Start: 2025-06-23 | End: 2025-06-26 | Stop reason: HOSPADM

## 2025-06-23 RX ORDER — MORPHINE SULFATE 0.5 MG/ML
INJECTION, SOLUTION EPIDURAL; INTRATHECAL; INTRAVENOUS
Status: DISCONTINUED | OUTPATIENT
Start: 2025-06-23 | End: 2025-06-23

## 2025-06-23 RX ORDER — OXYTOCIN-SODIUM CHLORIDE 0.9% IV SOLN 30 UNIT/500ML 30-0.9/5 UT/ML-%
95 SOLUTION INTRAVENOUS CONTINUOUS PRN
Status: DISCONTINUED | OUTPATIENT
Start: 2025-06-23 | End: 2025-06-23

## 2025-06-23 RX ORDER — OXYTOCIN-SODIUM CHLORIDE 0.9% IV SOLN 30 UNIT/500ML 30-0.9/5 UT/ML-%
10 SOLUTION INTRAVENOUS ONCE AS NEEDED
Status: DISCONTINUED | OUTPATIENT
Start: 2025-06-23 | End: 2025-06-26 | Stop reason: HOSPADM

## 2025-06-23 RX ORDER — MORPHINE SULFATE 4 MG/ML
2 INJECTION, SOLUTION INTRAMUSCULAR; INTRAVENOUS EVERY 5 MIN PRN
Status: DISCONTINUED | OUTPATIENT
Start: 2025-06-24 | End: 2025-06-26 | Stop reason: HOSPADM

## 2025-06-23 RX ORDER — DIPHENOXYLATE HYDROCHLORIDE AND ATROPINE SULFATE 2.5; .025 MG/1; MG/1
2 TABLET ORAL EVERY 6 HOURS PRN
Status: DISCONTINUED | OUTPATIENT
Start: 2025-06-23 | End: 2025-06-26 | Stop reason: HOSPADM

## 2025-06-23 RX ORDER — BISACODYL 10 MG/1
10 SUPPOSITORY RECTAL ONCE AS NEEDED
Status: DISCONTINUED | OUTPATIENT
Start: 2025-06-23 | End: 2025-06-26 | Stop reason: HOSPADM

## 2025-06-23 RX ORDER — OXYTOCIN-SODIUM CHLORIDE 0.9% IV SOLN 30 UNIT/500ML 30-0.9/5 UT/ML-%
95 SOLUTION INTRAVENOUS CONTINUOUS PRN
Status: DISCONTINUED | OUTPATIENT
Start: 2025-06-23 | End: 2025-06-26 | Stop reason: HOSPADM

## 2025-06-23 RX ORDER — OXYCODONE HYDROCHLORIDE 5 MG/1
10 TABLET ORAL EVERY 6 HOURS PRN
Status: DISCONTINUED | OUTPATIENT
Start: 2025-06-23 | End: 2025-06-23

## 2025-06-23 RX ORDER — SODIUM CITRATE AND CITRIC ACID MONOHYDRATE 334; 500 MG/5ML; MG/5ML
30 SOLUTION ORAL ONCE
Status: DISCONTINUED | OUTPATIENT
Start: 2025-06-23 | End: 2025-06-26 | Stop reason: HOSPADM

## 2025-06-23 RX ORDER — MUPIROCIN 20 MG/G
OINTMENT TOPICAL
Status: DISCONTINUED | OUTPATIENT
Start: 2025-06-23 | End: 2025-06-26 | Stop reason: HOSPADM

## 2025-06-23 RX ORDER — IBUPROFEN 600 MG/1
600 TABLET, FILM COATED ORAL
Status: DISCONTINUED | OUTPATIENT
Start: 2025-06-24 | End: 2025-06-26 | Stop reason: HOSPADM

## 2025-06-23 RX ORDER — ONDANSETRON 4 MG/1
8 TABLET, ORALLY DISINTEGRATING ORAL EVERY 8 HOURS PRN
Status: DISCONTINUED | OUTPATIENT
Start: 2025-06-23 | End: 2025-06-26 | Stop reason: HOSPADM

## 2025-06-23 RX ORDER — CARBOPROST TROMETHAMINE 250 UG/ML
250 INJECTION, SOLUTION INTRAMUSCULAR
Status: DISCONTINUED | OUTPATIENT
Start: 2025-06-23 | End: 2025-06-23

## 2025-06-23 RX ORDER — CARBOPROST TROMETHAMINE 250 UG/ML
250 INJECTION, SOLUTION INTRAMUSCULAR
Status: DISCONTINUED | OUTPATIENT
Start: 2025-06-23 | End: 2025-06-26 | Stop reason: HOSPADM

## 2025-06-23 RX ORDER — SODIUM CHLORIDE 0.9 % (FLUSH) 0.9 %
10 SYRINGE (ML) INJECTION
Status: DISCONTINUED | OUTPATIENT
Start: 2025-06-23 | End: 2025-06-26 | Stop reason: HOSPADM

## 2025-06-23 RX ORDER — ONDANSETRON HYDROCHLORIDE 2 MG/ML
4 INJECTION, SOLUTION INTRAVENOUS EVERY 6 HOURS PRN
Status: ACTIVE | OUTPATIENT
Start: 2025-06-23 | End: 2025-06-24

## 2025-06-23 RX ORDER — OXYTOCIN-SODIUM CHLORIDE 0.9% IV SOLN 30 UNIT/500ML 30-0.9/5 UT/ML-%
10 SOLUTION INTRAVENOUS ONCE AS NEEDED
Status: DISCONTINUED | OUTPATIENT
Start: 2025-06-23 | End: 2025-06-23

## 2025-06-23 RX ORDER — ACETAMINOPHEN 325 MG/1
650 TABLET ORAL EVERY 6 HOURS
Status: ACTIVE | OUTPATIENT
Start: 2025-06-24 | End: 2025-06-25

## 2025-06-23 RX ORDER — METHYLERGONOVINE MALEATE 0.2 MG/ML
200 INJECTION INTRAVENOUS
Status: DISCONTINUED | OUTPATIENT
Start: 2025-06-23 | End: 2025-06-23

## 2025-06-23 RX ORDER — ADHESIVE BANDAGE
30 BANDAGE TOPICAL 2 TIMES DAILY PRN
Status: DISCONTINUED | OUTPATIENT
Start: 2025-06-24 | End: 2025-06-26 | Stop reason: HOSPADM

## 2025-06-23 RX ORDER — DOCUSATE SODIUM 100 MG/1
200 CAPSULE, LIQUID FILLED ORAL 2 TIMES DAILY
Status: DISCONTINUED | OUTPATIENT
Start: 2025-06-23 | End: 2025-06-26 | Stop reason: HOSPADM

## 2025-06-23 RX ADMIN — FENTANYL CITRATE 100 MCG: 50 INJECTION, SOLUTION INTRAMUSCULAR; INTRAVENOUS at 10:06

## 2025-06-23 RX ADMIN — OXYTOCIN 40 UNITS: 10 INJECTION, SOLUTION INTRAMUSCULAR; INTRAVENOUS at 09:06

## 2025-06-23 RX ADMIN — CEFAZOLIN 2 G: 2 INJECTION, POWDER, FOR SOLUTION INTRAMUSCULAR; INTRAVENOUS at 09:06

## 2025-06-23 RX ADMIN — ONDANSETRON 4 MG: 2 INJECTION INTRAMUSCULAR; INTRAVENOUS at 10:06

## 2025-06-23 RX ADMIN — SODIUM CITRATE AND CITRIC ACID MONOHYDRATE 30 ML: 500; 334 SOLUTION ORAL at 09:06

## 2025-06-23 RX ADMIN — MORPHINE SULFATE 0.2 MG: 0.5 INJECTION EPIDURAL; INTRATHECAL; INTRAVENOUS at 09:06

## 2025-06-23 RX ADMIN — BUPIVACAINE HYDROCHLORIDE 12 MG: 7.5 INJECTION, SOLUTION EPIDURAL; RETROBULBAR at 09:06

## 2025-06-23 RX ADMIN — SODIUM CHLORIDE, POTASSIUM CHLORIDE, SODIUM LACTATE AND CALCIUM CHLORIDE 1000 ML: 600; 310; 30; 20 INJECTION, SOLUTION INTRAVENOUS at 08:06

## 2025-06-23 RX ADMIN — MORPHINE SULFATE 4.8 MG: 0.5 INJECTION EPIDURAL; INTRATHECAL; INTRAVENOUS at 10:06

## 2025-06-24 LAB
BASOPHILS # BLD AUTO: 0.01 X10(3)/MCL
BASOPHILS NFR BLD AUTO: 0.1 %
EOSINOPHIL # BLD AUTO: 0.02 X10(3)/MCL (ref 0–0.9)
EOSINOPHIL NFR BLD AUTO: 0.2 %
ERYTHROCYTE [DISTWIDTH] IN BLOOD BY AUTOMATED COUNT: 13.7 % (ref 11.5–17)
HCT VFR BLD AUTO: 32.9 % (ref 37–47)
HGB BLD-MCNC: 10.2 G/DL (ref 12–16)
IMM GRANULOCYTES # BLD AUTO: 0.05 X10(3)/MCL (ref 0–0.04)
IMM GRANULOCYTES NFR BLD AUTO: 0.4 %
LYMPHOCYTES # BLD AUTO: 1.21 X10(3)/MCL (ref 0.6–4.6)
LYMPHOCYTES NFR BLD AUTO: 10.9 %
MCH RBC QN AUTO: 25.1 PG (ref 27–31)
MCHC RBC AUTO-ENTMCNC: 31 G/DL (ref 33–36)
MCV RBC AUTO: 80.8 FL (ref 80–94)
MONOCYTES # BLD AUTO: 0.77 X10(3)/MCL (ref 0.1–1.3)
MONOCYTES NFR BLD AUTO: 6.9 %
NEUTROPHILS # BLD AUTO: 9.08 X10(3)/MCL (ref 2.1–9.2)
NEUTROPHILS NFR BLD AUTO: 81.5 %
NRBC BLD AUTO-RTO: 0 %
PLATELET # BLD AUTO: 161 X10(3)/MCL (ref 130–400)
PMV BLD AUTO: 11.1 FL (ref 7.4–10.4)
RBC # BLD AUTO: 4.07 X10(6)/MCL (ref 4.2–5.4)
WBC # BLD AUTO: 11.14 X10(3)/MCL (ref 4.5–11.5)

## 2025-06-24 PROCEDURE — 11000001 HC ACUTE MED/SURG PRIVATE ROOM

## 2025-06-24 PROCEDURE — 36415 COLL VENOUS BLD VENIPUNCTURE: CPT | Performed by: OBSTETRICS & GYNECOLOGY

## 2025-06-24 PROCEDURE — 25000003 PHARM REV CODE 250: Performed by: OBSTETRICS & GYNECOLOGY

## 2025-06-24 PROCEDURE — 85025 COMPLETE CBC W/AUTO DIFF WBC: CPT | Performed by: OBSTETRICS & GYNECOLOGY

## 2025-06-24 PROCEDURE — 63600175 PHARM REV CODE 636 W HCPCS: Performed by: OBSTETRICS & GYNECOLOGY

## 2025-06-24 RX ADMIN — DIPHENHYDRAMINE HYDROCHLORIDE 25 MG: 25 CAPSULE ORAL at 06:06

## 2025-06-24 RX ADMIN — OXYCODONE AND ACETAMINOPHEN 1 TABLET: 325; 10 TABLET ORAL at 10:06

## 2025-06-24 RX ADMIN — PRENATAL VITAMINS-IRON FUMARATE 27 MG IRON-FOLIC ACID 0.8 MG TABLET 1 TABLET: at 08:06

## 2025-06-24 RX ADMIN — SODIUM CHLORIDE, POTASSIUM CHLORIDE, SODIUM LACTATE AND CALCIUM CHLORIDE: 600; 310; 30; 20 INJECTION, SOLUTION INTRAVENOUS at 06:06

## 2025-06-24 RX ADMIN — HYDROMORPHONE HYDROCHLORIDE 1 MG: 2 INJECTION INTRAMUSCULAR; INTRAVENOUS; SUBCUTANEOUS at 01:06

## 2025-06-24 RX ADMIN — OXYCODONE AND ACETAMINOPHEN 1 TABLET: 325; 10 TABLET ORAL at 05:06

## 2025-06-24 RX ADMIN — KETOROLAC TROMETHAMINE 30 MG: 30 INJECTION, SOLUTION INTRAMUSCULAR; INTRAVENOUS at 08:06

## 2025-06-24 RX ADMIN — KETOROLAC TROMETHAMINE 30 MG: 30 INJECTION, SOLUTION INTRAMUSCULAR; INTRAVENOUS at 12:06

## 2025-06-24 RX ADMIN — DOCUSATE SODIUM 200 MG: 100 CAPSULE, LIQUID FILLED ORAL at 08:06

## 2025-06-24 RX ADMIN — KETOROLAC TROMETHAMINE 30 MG: 30 INJECTION, SOLUTION INTRAMUSCULAR; INTRAVENOUS at 03:06

## 2025-06-24 NOTE — OP NOTE
Operative Report for  Delivery:    Date of Procedure: 2025     Procedure: Procedure(s) (LRB):   SECTION (N/A)       Surgeons and Role:     * Patricio Leslie MD - Primary     * Denny Kohler MD - Assisting          Pre-Operative Diagnosis: Hx of shoulder dystocia in prior pregnancy, currently pregnant [O09.299]    Post-Operative Diagnosis: Post-Op Diagnosis Codes:     * Hx of shoulder dystocia in prior pregnancy, currently pregnant [O09.299]    Anesthesia: Spinal/Epidural    Complications: none    Findings: Viable female infant, normal uterus, tubes and ovaries    Procedure in detail:    The patient was taken to the operating room where epidural/spinal anesthesia was found to be adequate. She was then prepped and draped in the normal sterile fashion. Allis clamp was used to verify adequate anesthesia. A ji catheter was in place.     After Time Out was performed, A scalpel was used to make a Pfannensteil incision. The knife was used to carry down to the underlying layer of fascia. The fascia was then incised in the midline. The curved telles scissors were then used to extend the fascia incision laterally. The fascia was then elevated using the kocher clamps and extended both inferiorly and superiorly using the curved telles scissors. The rectus muscles were then  in the midline and the peritoneum was then entered bluntly and extended but bluntly and sharply with the curved telles scissors. The analia was then inserted and the vesicouterine peritoneum was then entered sharply with the Metzenbaum scissors and extended laterally and the bladder flap was created digitally. The bladder blade was then reinserted.     The inside scalpel was then used to make a LOW TRANSVERSE incision in the uterus. This was extended bluntly. The amniotic fluid was noted to be clear. The infants head was delivered atraumatically and the infant's body was delivered atraumatically. The nose and mouth were  suctioned with the bulb suction. The cord was clamped and cut and the baby was handed off to awaiting pediatric attendant. The placenta was then removed manually and the uterus was then exteriorized and cleared of all clots and debris. The bladder blade was then reinserted.     The uterine incision was then closed using a #1 Chromic in a running locked suture. AThe abdomen was then irrigated and cleared of all clots and debris. The uterine incision was then reexamined and noted to have excellent hemostasis. The uterus was then returned to the abdomen. The bladder blade was replaced and then uterine incision was then reexamined and noted to have excellent hemostasis.     The peritoneum and rectus muscles were then re approximated using 2-0 Vicryl in a running fashion. The rectus muscles were then irrigated and and bleeding areas were cauterized using the Bovie with excellent hemostasis. The fascia was then closed using #1 Vicryl in a running fashion. The fat was then cauterized for any small bleeding areas. 2-0 plain gut was then used to re approximate the fat. 4-0 Monocryl was then used to close the skin in a subcuticular fashion. The patient tolerated the procedure well. Sponge lap and needle counts were correct x 2.

## 2025-06-24 NOTE — ANESTHESIA PREPROCEDURE EVALUATION
2025  Shani Stephens is a 25 y.o., female.      Shani Stephens    Pre-op Diagnosis: Hx of shoulder dystocia in prior pregnancy, currently pregnant [O09.299]    Procedure(s):  SECTION     Review of patient's allergies indicates:  No Known Allergies    Current Outpatient Medications   Medication Instructions    aspirin (ECOTRIN) 81 mg, Oral, Daily    ferrous sulfate 325 mg, Oral, Daily    PNV,calcium 72-iron,carb-folic (PRENATAL PLUS) 29 mg iron- 1 mg Tab 1 tablet, Oral, Daily           Past Medical History:   Diagnosis Date    Missed  10/2024    Prior pregnancy with fetal demise 2023    at 38 weeks GA       Past Surgical History:   Procedure Laterality Date    DILATION AND CURETTAGE OF UTERUS  10/2024    missed incomplete      Recent Labs   Lab 25  1611   WBC 7.91   RBC 4.30   HGB 10.9*   HCT 34.5*   MCV 80.2   MCH 25.3*   MCHC 31.6*   RDW 13.6      MPV 10.9*       Pre-op Assessment    I have reviewed the Patient Summary Reports.    I have reviewed the NPO Status.   I have reviewed the Medications.     Review of Systems  Anesthesia Hx:  No problems with previous Anesthesia             Denies Family Hx of Anesthesia complications.    Denies Personal Hx of Anesthesia complications.                    Social:  Non-Smoker       Cardiovascular:  Exercise tolerance: good          Denies Angina.   Denies Orthopnea.  Denies PND.    Denies SERVIN.      Functional Capacity good / => 4 METS                         Musculoskeletal:  Musculoskeletal Normal                Neurological:  Denies TIA.  Denies CVA.                                    Psych:  Psychiatric Normal                    Physical Exam  General: Well nourished, Alert and Oriented    Airway:  Mallampati: III   Mouth Opening: Normal  TM Distance: Normal  Tongue: Normal  Neck ROM: Normal  ROM    Dental:  Intact    Chest/Lungs:  Clear to auscultation    Heart:  Rate: Normal  Rhythm: Regular Rhythm  No pretibial edema  No carotid bruits      Anesthesia Plan  Type of Anesthesia, risks & benefits discussed:    Anesthesia Type: Spinal  Intra-op Monitoring Plan: Standard ASA Monitors  Post Op Pain Control Plan: multimodal analgesia  Induction:  IV  Airway Plan: Direct  Informed Consent: Informed consent signed with the Patient and all parties understand the risks and agree with anesthesia plan.  All questions answered. Patient consented to blood products? Yes  ASA Score: 2 Emergent  Day of Surgery Review of History & Physical: H&P Update referred to the surgeon/provider.  Anesthesia Plan Notes: Risks post spinal headache, backache, high spinal, sensory / motor neuropathy, & failed spinal with possible General Anesthesia (GETA) explained & patient accepts     Ready For Surgery From Anesthesia Perspective.     .

## 2025-06-24 NOTE — TRANSFER OF CARE
Anesthesia Transfer of Care Note    Patient: Shani Stephens    Procedure(s) Performed: Procedure(s) (LRB):   SECTION (N/A)    Patient location: Labor and Delivery    Anesthesia Type: spinal    Transport from OR: Transported from OR on room air with adequate spontaneous ventilation    Post pain: adequate analgesia    Post assessment: no apparent anesthetic complications    Post vital signs: stable    Level of consciousness: awake, alert and oriented    Nausea/Vomiting: no nausea/vomiting    Complications: none    Transfer of care protocol was followed      Last vitals: Visit Vitals  /79   Pulse 83   Temp 36.6 °C (97.9 °F)   Resp 18   LMP 10/20/2024   SpO2 99%

## 2025-06-24 NOTE — PLAN OF CARE
Problem:  Fall Injury Risk  Goal: Absence of Fall, Infant Drop and Related Injury  Outcome: Progressing     Problem: Adult Inpatient Plan of Care  Goal: Plan of Care Review  Outcome: Progressing  Goal: Patient-Specific Goal (Individualized)  Outcome: Progressing  Goal: Absence of Hospital-Acquired Illness or Injury  Outcome: Progressing  Goal: Optimal Comfort and Wellbeing  Outcome: Progressing  Goal: Readiness for Transition of Care  Outcome: Progressing     Problem: Infection  Goal: Absence of Infection Signs and Symptoms  Outcome: Progressing     Problem:  Delivery  Goal: Bleeding is Controlled  Outcome: Progressing  Goal: Stable Fetal Wellbeing  Outcome: Progressing  Goal: Absence of Infection Signs and Symptoms  Outcome: Progressing  Goal: Effective Oxygenation and Ventilation  Outcome: Progressing

## 2025-06-24 NOTE — ED PROVIDER NOTES
Encounter Date: 2025       History     Chief Complaint   Patient presents with    Abdominal Pain     Iup @ 37.1 with c/o lower abdominal cramping that started earlier today prior to her appt with her OBGYN. She states Dr. Leslie told her to go to the hospital if pain persists.      HPI  Review of patient's allergies indicates:  No Known Allergies  Past Medical History:   Diagnosis Date    Missed  10/2024    Prior pregnancy with fetal demise 2023    at 38 weeks GA     Past Surgical History:   Procedure Laterality Date    DILATION AND CURETTAGE OF UTERUS  10/2024    missed incomplete      Family History   Problem Relation Name Age of Onset    No Known Problems Paternal Grandfather      Breast cancer Paternal Grandmother Sofia Stephens     No Known Problems Maternal Grandmother      No Known Problems Maternal Grandfather      No Known Problems Father      No Known Problems Mother       Social History[1]  Review of Systems    Physical Exam     Initial Vitals   BP Pulse Resp Temp SpO2   25   (!) 140/81 80 18 97.9 °F (36.6 °C) 99 %      MAP       --                Physical Exam    ED Course   Procedures  Labs Reviewed - No data to display       Imaging Results    None          Medications - No data to display  Medical Decision Making                                    Clinical Impression:   This  @ 37weeks gestation presents with a history of fetal demise at term followed by a shoulder dystocia. Reports +FM, no LOF, no UB, +CTX  Denies any complications this pregnancy    Tracing: reactive  VE: 3/70/-1 (inadequate pelvis)    A/P: Admit for labor, possible  section secondary to inadequate pelvis  -admit  -labs  -IV fluids  -prepare for  section                [1]   Social History  Tobacco Use    Smoking status: Never     Passive exposure: Never    Smokeless tobacco: Never   Substance Use Topics    Alcohol use: Not  Currently    Drug use: Never        Conrado Sparks MD  06/23/25 2048

## 2025-06-24 NOTE — PROGRESS NOTES
" Delivery Progress Note  Obstetrics        SUBJECTIVE:     Postpartum Day 1:  Delivery    Ms. Stephens states she feels well. She denies emotional concerns. Her pain is well controlled with current medications. The baby is well. The baby is feeding via breast. The patient is ambulating well. Ms. Stephens is tolerating a normal diet. Flatus has been passed. Urinary output is adequate.    OBJECTIVE:     Vital Signs (Most Recent):  Temp: 98.1 °F (36.7 °C) (25 0430)  Pulse: 68 (25 043)  Resp: 16 (25 0122)  BP: 132/75 (25)  SpO2: 99 % (25)    Vital Signs Range (Last 24H):  Temp:  [97.8 °F (36.6 °C)-98.3 °F (36.8 °C)]   Pulse:  [58-92]   Resp:  [15-18]   BP: (118-147)/(60-90)   SpO2:  [98 %-100 %]     I & O (Last 24H):  Intake/Output Summary (Last 24 hours) at 2025 0725  Last data filed at 2025 0600  Gross per 24 hour   Intake 1800 ml   Output 2450 ml   Net -650 ml     Physical Exam:  General:    no distress   Lungs:     Heart:     Breasts:     Abdomen:  soft, non-tender; bowel sounds normal   Fundus:  firm   Incision:  healing well   Lochia:   scant rubra   DVT Evaluation:  No evidence of DVT on either side seen on physical exam.     Hemoglobin/Hematocrit  Recent Labs   Lab 25   HGB 10.2*   HCT 32.9*     ABO/Rh  Lab Results   Component Value Date    [facility] POS 2025     Rubella  No results for input(s): "[id number]" in the last 168 hours.    ASSESSMENT/PLAN:     Status post  section. Doing well postoperatively.     Continue current care.  "

## 2025-06-24 NOTE — H&P
This  @ 37weeks gestation presents with a history of fetal demise at term followed by a shoulder dystocia. Reports +FM, no LOF, no UB, +CTX  Denies any complications this pregnancy     Tracing: reactive  VE: 3/70/-1 (inadequate pelvis)     A/P: Admit for labor, possible  section secondary to inadequate pelvis  -admit  -labs  -IV fluids  -prepare for  section

## 2025-06-24 NOTE — ANESTHESIA POSTPROCEDURE EVALUATION
Anesthesia Post Evaluation    Patient: Shani Stephens    Procedure(s) Performed: Procedure(s) (LRB):   SECTION (N/A)    Final Anesthesia Type: spinal      Patient location during evaluation: PACU  Patient participation: Yes- Able to Participate  Level of consciousness: awake and alert, awake and oriented  Post-procedure vital signs: reviewed and stable  Pain management: adequate  Airway patency: patent    PONV status at discharge: No PONV  Anesthetic complications: no      Cardiovascular status: blood pressure returned to baseline and stable  Respiratory status: unassisted  Hydration status: euvolemic  Follow-up not needed.              Vitals Value Taken Time   /76 25 10:54   Temp 36.9 °C (98.5 °F) 25 10:54   Pulse 73 25 10:54   Resp 18 25 10:54   SpO2 99 % 25 10:54         Event Time   Out of Recovery 23:25:00         Pain/Leyla Score: Pain Rating Prior to Med Admin: 8 (2025 10:18 AM)  Pain Rating Post Med Admin: 2 (2025  9:00 AM)  Leyla Score: 9 (2025 11:25 PM)

## 2025-06-24 NOTE — H&P
[facility] - Emergency Dept (OB)  Obstetrics  History & Physical    Patient Name: Shani Stephens  MRN: [id number]  Admission Date: 2025  Primary Care Provider: Anne Wilson DO    Subjective:     Principal Problem:labor   Hx of IUFD w severe shoulder dystocia    History of Present Illness: pt in active labor hx of iufd w severe shoulder dcystocia for primary cs    Obstetric HPI:  Patient reports Q2-4 contractions, active fetal movement, Yes vaginal bleeding , Yes loss of fluid     This pregnancy has been complicated by iufd   Hx of Shoulder dystocia    OB History    Para Term  AB Living   4 2 2 0 1 1   SAB IAB Ectopic Multiple Live Births   1 0 0 0 2      # Outcome Date GA Lbr Eleazar/2nd Weight Sex Type Anes PTL Lv   4 Current            3 SAB 10/2024     SAB   FD   2 Term 23     Vag-Spont   ND   1 Term 17     Vag-Spont   BILL     Past Medical History:   Diagnosis Date    Missed  10/2024    Prior pregnancy with fetal demise 2023    at 38 weeks GA     Past Surgical History:   Procedure Laterality Date    DILATION AND CURETTAGE OF UTERUS  10/2024    missed incomplete        (Not in a hospital admission)      Review of patient's allergies indicates:  No Known Allergies     Family History       Problem Relation (Age of Onset)    Breast cancer Paternal Grandmother    No Known Problems Paternal Grandfather, Maternal Grandmother, Maternal Grandfather, Father, Mother          Tobacco Use    Smoking status: Never     Passive exposure: Never    Smokeless tobacco: Never   Substance and Sexual Activity    Alcohol use: Not Currently    Drug use: Never    Sexual activity: Yes     Partners: Male     Review of Systems   Objective:     Vital Signs (Most Recent):  Temp: 97.9 °F (36.6 °C) (25)  Pulse: 83 (25)  Resp: 18 (25)  BP: 139/79 (25)  SpO2: 99 % (25) Vital Signs (24h Range):  Temp:  [97.9 °F (36.6 °C)] 97.9 °F  (36.6 °C)  Pulse:  [80-92] 83  Resp:  [18] 18  SpO2:  [98 %-99 %] 99 %  BP: (118-140)/(72-81) 139/79        There is no height or weight on file to calculate BMI.    FHT: 140 Cat 1 (reassuring)  TOCO:  Q 2-3 minutes    Physical Exam    Cervix:  Dilation:  3  Effacement:  75%  Station: -2  Presentation: Vertex     Significant Labs:  Lab Results   Component Value Date    GROUPTRH B POS 2025    HEPBSAG Nonreactive 2025    STREPBCULT positive 10/25/2023    AFP 36.9 2025       I have personallly reviewed all pertinent lab results from the last 24 hours.    Assessment/Plan:     25 y.o. female  at 37w1d for:    There are no hospital problems to display for this patient.      Rba to cs discussed   Time for Q&A taken pt understands will proceed to cs    Patricio Leslie MD  Obstetrics  [facility] - Emergency Dept (OB)

## 2025-06-24 NOTE — PLAN OF CARE
Problem:  Fall Injury Risk  Goal: Absence of Fall, Infant Drop and Related Injury  Outcome: Progressing     Problem: Adult Inpatient Plan of Care  Goal: Plan of Care Review  Outcome: Progressing  Goal: Patient-Specific Goal (Individualized)  Outcome: Progressing  Goal: Absence of Hospital-Acquired Illness or Injury  Outcome: Progressing  Goal: Optimal Comfort and Wellbeing  Outcome: Progressing  Goal: Readiness for Transition of Care  Outcome: Progressing     Problem: Infection  Goal: Absence of Infection Signs and Symptoms  Outcome: Progressing     Problem:  Delivery  Goal: Bleeding is Controlled  Outcome: Progressing  Goal: Stable Fetal Wellbeing  Outcome: Progressing  Goal: Absence of Infection Signs and Symptoms  Outcome: Progressing  Goal: Effective Oxygenation and Ventilation  Outcome: Progressing     Problem: Wound  Goal: Optimal Coping  Outcome: Progressing  Goal: Optimal Functional Ability  Outcome: Progressing  Goal: Absence of Infection Signs and Symptoms  Outcome: Progressing  Goal: Improved Oral Intake  Outcome: Progressing  Goal: Optimal Pain Control and Function  Outcome: Progressing  Goal: Skin Health and Integrity  Outcome: Progressing  Goal: Optimal Wound Healing  Outcome: Progressing     Problem: Postpartum ( Delivery)  Goal: Successful Parent Role Transition  Outcome: Progressing  Goal: Hemostasis  Outcome: Progressing  Goal: Effective Bowel Elimination  Outcome: Progressing  Goal: Fluid and Electrolyte Balance  Outcome: Progressing  Goal: Absence of Infection Signs and Symptoms  Outcome: Progressing  Goal: Anesthesia/Sedation Recovery  Outcome: Progressing  Goal: Optimal Pain Control and Function  Outcome: Progressing  Goal: Nausea and Vomiting Relief  Outcome: Progressing  Goal: Effective Urinary Elimination  Outcome: Progressing  Goal: Effective Oxygenation and Ventilation  Outcome: Progressing

## 2025-06-24 NOTE — ANESTHESIA PROCEDURE NOTES
Spinal    Diagnosis: Labor  Patient location during procedure: OB  Start time: 6/23/2025 9:31 PM  Timeout: 6/23/2025 9:30 PM  End time: 6/23/2025 9:35 PM    Staffing  Authorizing Provider: Angel Granado CRNA  Performing Provider: Angel Granado CRNA    Staffing  Performed by: Angel Granado CRNA  Authorized by: Angel Granado CRNA    Preanesthetic Checklist  Completed: patient identified, IV checked, site marked, risks and benefits discussed, surgical consent, monitors and equipment checked, pre-op evaluation and timeout performed  Spinal Block  Patient position: sitting  Prep: ChloraPrep  Patient monitoring: heart rate, cardiac monitor, continuous pulse ox and frequent blood pressure checks  Approach: midline  Location: L3-4  Injection technique: single shot  CSF Fluid: clear free-flowing CSF  Needle  Needle type: pencil-tip   Needle gauge: 25 G  Needle length: 5 in  Additional Documentation: negative aspiration for heme and incremental injection  Needle localization: anatomical landmarks  Assessment  Sensory level: T4   Dermatomal levels determined by alcohol wipe  Ease of block: easy  Patient's tolerance of the procedure: comfortable throughout block and no complaints  Additional Notes  T 4 level

## 2025-06-25 PROCEDURE — 11000001 HC ACUTE MED/SURG PRIVATE ROOM

## 2025-06-25 PROCEDURE — 25000003 PHARM REV CODE 250: Performed by: OBSTETRICS & GYNECOLOGY

## 2025-06-25 RX ADMIN — IBUPROFEN 600 MG: 600 TABLET ORAL at 06:06

## 2025-06-25 RX ADMIN — OXYCODONE AND ACETAMINOPHEN 1 TABLET: 325; 10 TABLET ORAL at 10:06

## 2025-06-25 RX ADMIN — DOCUSATE SODIUM 200 MG: 100 CAPSULE, LIQUID FILLED ORAL at 10:06

## 2025-06-25 RX ADMIN — IBUPROFEN 600 MG: 600 TABLET ORAL at 12:06

## 2025-06-25 RX ADMIN — IBUPROFEN 600 MG: 600 TABLET ORAL at 11:06

## 2025-06-25 RX ADMIN — DOCUSATE SODIUM 200 MG: 100 CAPSULE, LIQUID FILLED ORAL at 08:06

## 2025-06-25 RX ADMIN — OXYCODONE AND ACETAMINOPHEN 1 TABLET: 325; 10 TABLET ORAL at 05:06

## 2025-06-25 RX ADMIN — OXYCODONE AND ACETAMINOPHEN 1 TABLET: 325; 10 TABLET ORAL at 08:06

## 2025-06-25 RX ADMIN — IBUPROFEN 600 MG: 600 TABLET ORAL at 05:06

## 2025-06-25 NOTE — PROGRESS NOTES
" Delivery Progress Note  Obstetrics        SUBJECTIVE:     Postpartum Day 2:  Delivery    Ms. Stephens states she feels well. She denies emotional concerns. Her pain is well controlled with current medications. The baby is well. The baby is feeding via breast. The patient is ambulating well. Ms. Stephens is tolerating a normal diet. Flatus has been passed. Urinary output is adequate.    OBJECTIVE:     Vital Signs (Most Recent):  Temp: 97.8 °F (36.6 °C) (25 0711)  Pulse: 79 (25 0711)  Resp: (!) 22 (25 0837)  BP: (!) 142/80 (25 07)  SpO2: 99 % (25)    Vital Signs Range (Last 24H):  Temp:  [97.8 °F (36.6 °C)-99.3 °F (37.4 °C)]   Pulse:  [66-83]   Resp:  [17-22]   BP: (129-144)/(74-81)   SpO2:  [98 %-99 %]     I & O (Last 24H):  Intake/Output Summary (Last 24 hours) at 2025 0911  Last data filed at 2025 1018  Gross per 24 hour   Intake --   Output 1600 ml   Net -1600 ml     Physical Exam:  General:    no distress   Lungs:     Heart:     Breasts:     Abdomen:  soft, non-tender; bowel sounds normal   Fundus:  firm   Incision:  healing well,cdi   Lochia:   rubra   DVT Evaluation:  No evidence of DVT on either side seen on physical exam.     Hemoglobin/Hematocrit  Recent Labs   Lab 25  0437   HGB 10.2*   HCT 32.9*     ABO/Rh  Lab Results   Component Value Date    [facility]     Rubella  No results for input(s): "RUBELLAIGGSC" in the last 168 hours.    ASSESSMENT/PLAN:     Status post  section. Doing well postoperatively.     Continue current care.  "

## 2025-06-26 VITALS
RESPIRATION RATE: 22 BRPM | HEART RATE: 76 BPM | DIASTOLIC BLOOD PRESSURE: 71 MMHG | OXYGEN SATURATION: 99 % | SYSTOLIC BLOOD PRESSURE: 131 MMHG | TEMPERATURE: 99 F

## 2025-06-26 PROBLEM — Z78.9 ADMITTED TO LABOR AND DELIVERY: Status: ACTIVE | Noted: 2025-06-26

## 2025-06-26 PROCEDURE — 25000003 PHARM REV CODE 250: Performed by: OBSTETRICS & GYNECOLOGY

## 2025-06-26 RX ORDER — IBUPROFEN 600 MG/1
600 TABLET, FILM COATED ORAL EVERY 6 HOURS
Qty: 28 TABLET | Refills: 0 | Status: SHIPPED | OUTPATIENT
Start: 2025-06-26

## 2025-06-26 RX ADMIN — IBUPROFEN 600 MG: 600 TABLET ORAL at 06:06

## 2025-06-26 RX ADMIN — IBUPROFEN 600 MG: 600 TABLET ORAL at 08:06

## 2025-06-26 RX ADMIN — OXYCODONE AND ACETAMINOPHEN 1 TABLET: 325; 10 TABLET ORAL at 08:06

## 2025-06-26 RX ADMIN — IBUPROFEN 600 MG: 600 TABLET ORAL at 12:06

## 2025-06-26 RX ADMIN — DOCUSATE SODIUM 200 MG: 100 CAPSULE, LIQUID FILLED ORAL at 08:06

## 2025-06-26 NOTE — PROGRESS NOTES
Pt seen this am no complaints wants to go home  Her and baby doing well  Vss afebrile  Aao3  Abd nt nd cdi  Extnt    Sp cs pod3 doing well  Dc home

## 2025-07-01 NOTE — DISCHARGE SUMMARY
[facility]  Obstetrics  Discharge Summary      Patient Name: Shani tSephens  MRN: [id number]  Admission Date: 2025  Hospital Length of Stay: 3 days  Discharge Date and Time: 2025 11:07 AM  Attending Physician: No att. providers found   Discharging Provider: Patricio Leslie MD  Primary Care Provider: Anne Wilson DO    HPI:      Procedure(s) (LRB):   SECTION (N/A)     Hospital Course:         Final Active Diagnoses:    Diagnosis Date Noted POA    PRINCIPAL PROBLEM:  Admitted to labor and delivery [Z78.9] 2025 Unknown      Problems Resolved During this Admission:        Labs: CMP No results for input(s): "NA", "K", "CL", "CO2", "GLU", "BUN", "CREATININE", "CALCIUM", "PROT", "ALBUMIN", "BILITOT", "ALKPHOS", "AST", "ALT", "ANIONGAP", "ESTGFRAFRICA", "EGFRNONAA" in the last 48 hours. and CBC No results for input(s): "WBC", "HGB", "HCT", "PLT" in the last 48 hours.    Feeding Method: breast    Immunizations       Date Immunization Status Dose Route/Site Given by    25 1437 MMR Deleted 0.5 mL Subcutaneous/     25 0953 Tdap Deferred 0.5 mL Intramuscular/ Alda Kong RN            Delivery:    Episiotomy:     Lacerations:     Repair suture:     Repair # of packets:     Blood loss (ml):       Birth information:  YOB: 2026   Time of birth: 9:58 PM   Sex: female   Delivery type: , Low Transverse   Gestational Age: 37w1d     Measurements    Weight: 2990 g  Weight (lbs): 6 lb 9.5 oz  Length: 48.3 cm  Length (in): 19"  Head circumference: 34.3 cm         Delivery Clinician: Delivery Providers    Delivering clinician: Patricio Leslie MD   Provider Role    Denny Kohler MD Assisting Surgeon    Deuce, Jovita, RN Delivery Nurse             Additional  information:  Forceps:    Vacuum:    Breech:    Observed anomalies      Living?:     Apgars    Living status: Living  Apgar Component Scores:  1 min.:  5 min.:  10 min.:  15 " min.:  20 min.:    Skin color:  0  1       Heart rate:  2  2       Reflex irritability:  2  2       Muscle tone:  2  2       Respiratory effort:  2  2       Total:  8  9       Apgars assigned by: CINDY MOYER         Placenta: Delivered:       appearance  Pending Diagnostic Studies:       None            Discharged Condition: stable    Disposition: Home or Self Care    Follow Up:   Follow-up Information       Patricio Leslie MD. Go on 7/7/2025.    Specialty: Obstetrics and Gynecology  Why: Follow-up appt with Dr. Leslie for an incision check at 10:30a  Contact information:  [id number]  [facility] [id number] [phone]                           Patient Instructions:   No discharge procedures on file.  Medications:  Discharge Medication List as of 6/26/2025 10:05 AM        START taking these medications    Details   ibuprofen (ADVIL,MOTRIN) 600 MG tablet Take 1 tablet (600 mg total) by mouth every 6 (six) hours., Starting Thu 6/26/2025, Normal           CONTINUE these medications which have NOT CHANGED    Details   aspirin (ECOTRIN) 81 MG EC tablet Take 1 tablet (81 mg total) by mouth once daily., Starting Thu 2/20/2025, Until Fri 2/20/2026, Normal      ferrous sulfate 325 (65 FE) MG EC tablet Take 1 tablet (325 mg total) by mouth once daily., Starting Fri 6/20/2025, Normal      PNV,calcium 72-iron,carb-folic (PRENATAL PLUS) 29 mg iron- 1 mg Tab Take 1 tablet by mouth once daily., Starting Mon 1/6/2025, Normal             Patricio Leslie MD  Obstetrics  [facility] - 2nd Floor Mother/Baby Unit

## 2025-07-02 ENCOUNTER — ANESTHESIA (OUTPATIENT)
Dept: OBSTETRICS AND GYNECOLOGY | Facility: HOSPITAL | Age: 25
End: 2025-07-02
Payer: MEDICAID

## (undated) DEVICE — SUT CHROMIC GUT 2-0 CT-1 27IN

## (undated) DEVICE — PAD UNDERPAD 30X30

## (undated) DEVICE — SEE MEDLINE ITEM 156931

## (undated) DEVICE — Device

## (undated) DEVICE — SUT VICRYL + 2-0 27IN XLH

## (undated) DEVICE — SOL WATER STRL IRR 1000ML

## (undated) DEVICE — BINDER ABDOM 4PANEL 12IN LG/XL

## (undated) DEVICE — SUT CTD VICRYL 1 VIL BR CTX

## (undated) DEVICE — TRAY CATH 1-LYR URIMTR 16FR

## (undated) DEVICE — SEE MEDLINE ITEM 157117

## (undated) DEVICE — PENCIL SMK EVAC CONNECTOR 10FT

## (undated) DEVICE — CAP BABY BEANIE

## (undated) DEVICE — SUT MONOCRYL 4-0 PS-1 UND

## (undated) DEVICE — SOL NACL IRR 1000ML BTL

## (undated) DEVICE — PAD SANITARY OB STERILE

## (undated) DEVICE — ELECTRODE REM PLYHSV RETURN 9

## (undated) DEVICE — BULB SYRINGE EAR IRRIGATION

## (undated) DEVICE — SUT 1 36IN CHROMIC GUT CTX

## (undated) DEVICE — SUT 3/0 36IN COATED VICRYL

## (undated) DEVICE — SYS CLSR DERMABOND PRINEO 22CM

## (undated) DEVICE — DRESSING TEGADERM 4.4X5IN